# Patient Record
Sex: MALE | Race: WHITE | Employment: FULL TIME | URBAN - METROPOLITAN AREA
[De-identification: names, ages, dates, MRNs, and addresses within clinical notes are randomized per-mention and may not be internally consistent; named-entity substitution may affect disease eponyms.]

---

## 2017-05-22 ENCOUNTER — HOSPITAL ENCOUNTER (OUTPATIENT)
Age: 52
Discharge: HOME | End: 2017-05-22
Payer: COMMERCIAL

## 2017-05-22 ENCOUNTER — HOSPITAL ENCOUNTER (OUTPATIENT)
Dept: GENERAL RADIOLOGY | Age: 52
Discharge: HOME | End: 2017-05-22
Payer: COMMERCIAL

## 2017-05-22 DIAGNOSIS — M25.562 LEFT KNEE PAIN, UNSPECIFIED CHRONICITY: ICD-10-CM

## 2017-05-22 LAB
ALBUMIN SERPL-MCNC: 4.5 G/DL (ref 3.5–5.2)
ALBUMIN/GLOBULIN RATIO: NORMAL (ref 1–2.5)
ALP BLD-CCNC: 66 U/L (ref 40–129)
ALT SERPL-CCNC: 38 U/L (ref 5–41)
AST SERPL-CCNC: 18 U/L
BILIRUB SERPL-MCNC: 0.49 MG/DL (ref 0.3–1.2)
BILIRUBIN DIRECT: 0.09 MG/DL
BILIRUBIN, INDIRECT: 0.4 MG/DL (ref 0–1)
CALCIUM IONIZED: 1.25 MMOL/L (ref 1.13–1.33)
CALCIUM SERPL-MCNC: 9.5 MG/DL (ref 8.6–10.4)
CHOLESTEROL/HDL RATIO: 3.8
CHOLESTEROL: 164 MG/DL
FOLATE: 14.9 NG/ML
GLOBULIN: NORMAL G/DL (ref 1.5–3.8)
HDLC SERPL-MCNC: 43 MG/DL
HEMOGLOBIN: 15.9 G/DL (ref 13.5–17.5)
IRON: 69 UG/DL (ref 59–158)
LDL CHOLESTEROL: 86 MG/DL (ref 0–130)
PTH INTACT: 78.54 PG/ML (ref 15–65)
TOTAL PROTEIN: 7.3 G/DL (ref 6.4–8.3)
TRIGL SERPL-MCNC: 175 MG/DL
VITAMIN B-12: 529 PG/ML (ref 211–946)
VITAMIN D 25-HYDROXY: 10 NG/ML (ref 30–100)
VLDLC SERPL CALC-MCNC: ABNORMAL MG/DL (ref 1–30)

## 2017-05-22 PROCEDURE — 82310 ASSAY OF CALCIUM: CPT

## 2017-05-22 PROCEDURE — 80076 HEPATIC FUNCTION PANEL: CPT

## 2017-05-22 PROCEDURE — 85018 HEMOGLOBIN: CPT

## 2017-05-22 PROCEDURE — 36415 COLL VENOUS BLD VENIPUNCTURE: CPT

## 2017-05-22 PROCEDURE — 82330 ASSAY OF CALCIUM: CPT

## 2017-05-22 PROCEDURE — 73562 X-RAY EXAM OF KNEE 3: CPT

## 2017-05-22 PROCEDURE — 82746 ASSAY OF FOLIC ACID SERUM: CPT

## 2017-05-22 PROCEDURE — 83540 ASSAY OF IRON: CPT

## 2017-05-22 PROCEDURE — 83970 ASSAY OF PARATHORMONE: CPT

## 2017-05-22 PROCEDURE — 83036 HEMOGLOBIN GLYCOSYLATED A1C: CPT

## 2017-05-22 PROCEDURE — 82607 VITAMIN B-12: CPT

## 2017-05-22 PROCEDURE — 82306 VITAMIN D 25 HYDROXY: CPT

## 2017-05-22 PROCEDURE — 80061 LIPID PANEL: CPT

## 2017-05-23 LAB
ESTIMATED AVERAGE GLUCOSE: 103 MG/DL
HBA1C MFR BLD: 5.2 % (ref 4–6)

## 2018-03-27 ENCOUNTER — HOSPITAL ENCOUNTER (OUTPATIENT)
Age: 53
Discharge: HOME | End: 2018-03-27
Payer: COMMERCIAL

## 2018-03-27 LAB
ALBUMIN SERPL-MCNC: 4.2 G/DL (ref 3.5–5.2)
ALBUMIN/GLOBULIN RATIO: NORMAL (ref 1–2.5)
ALP BLD-CCNC: 73 U/L (ref 40–129)
ALT SERPL-CCNC: 24 U/L (ref 5–41)
AST SERPL-CCNC: 17 U/L
BILIRUB SERPL-MCNC: 0.62 MG/DL (ref 0.3–1.2)
BILIRUBIN DIRECT: 0.17 MG/DL
BILIRUBIN, INDIRECT: 0.45 MG/DL (ref 0–1)
FOLATE: >20 NG/ML
GLOBULIN: NORMAL G/DL (ref 1.5–3.8)
HCT VFR BLD CALC: 43.8 % (ref 41–53)
HEMOGLOBIN: 14.5 G/DL (ref 13.5–17.5)
IRON: 38 UG/DL (ref 59–158)
MCH RBC QN AUTO: 28.1 PG (ref 26–34)
MCHC RBC AUTO-ENTMCNC: 33.2 G/DL (ref 31–37)
MCV RBC AUTO: 84.7 FL (ref 80–100)
NRBC AUTOMATED: NORMAL PER 100 WBC
PDW BLD-RTO: 14.5 % (ref 11.5–14.9)
PLATELET # BLD: 173 K/UL (ref 150–450)
PMV BLD AUTO: 9.5 FL (ref 6–12)
RBC # BLD: 5.17 M/UL (ref 4.5–5.9)
TOTAL PROTEIN: 6.7 G/DL (ref 6.4–8.3)
VITAMIN B-12: 817 PG/ML (ref 232–1245)
VITAMIN D 25-HYDROXY: 55.3 NG/ML (ref 30–100)
WBC # BLD: 5.6 K/UL (ref 3.5–11)

## 2018-03-27 PROCEDURE — 36415 COLL VENOUS BLD VENIPUNCTURE: CPT

## 2018-03-27 PROCEDURE — 82746 ASSAY OF FOLIC ACID SERUM: CPT

## 2018-03-27 PROCEDURE — 85027 COMPLETE CBC AUTOMATED: CPT

## 2018-03-27 PROCEDURE — 82306 VITAMIN D 25 HYDROXY: CPT

## 2018-03-27 PROCEDURE — 83540 ASSAY OF IRON: CPT

## 2018-03-27 PROCEDURE — 84425 ASSAY OF VITAMIN B-1: CPT

## 2018-03-27 PROCEDURE — 80076 HEPATIC FUNCTION PANEL: CPT

## 2018-03-27 PROCEDURE — 82607 VITAMIN B-12: CPT

## 2018-03-31 LAB — VITAMIN B1 WHOLE BLOOD: 122 NMOL/L (ref 70–180)

## 2020-08-27 ENCOUNTER — OFFICE VISIT (OUTPATIENT)
Dept: ORTHOPEDIC SURGERY | Age: 55
End: 2020-08-27
Payer: COMMERCIAL

## 2020-08-27 PROCEDURE — 99213 OFFICE O/P EST LOW 20 MIN: CPT | Performed by: ORTHOPAEDIC SURGERY

## 2020-08-27 NOTE — PROGRESS NOTES
Ratna Conti M.D.            [address] [place] [address]., [address] [facility],[address], [address] [facility]           Dept Phone: [phone]           Dept Fax:  [phone]           Duyen Belcher          Dept Phone: [phone]           Dept Fax:  [phone]      Chief Compliant:  Chief Complaint   Patient presents with    Pain     Rt knee         History of Present Illness: This is a 47 y.o. male who presents to the clinic today for evaluation / follow up of severe bilateral knee pain. Patient has a history of previous ACL reconstruction of his left knee done approximately 18 years ago. Patient has had bariatric surgery and has 160 pound weight loss. Patient has had numerous injections to his knees in the past.  Patient works as a  and is difficult for him to do his work. He is having difficulty ambulating and is affecting his sleep. He is having a tough time doing his daily activities. Patient has tried anti-inflammatories years in the past.  He is difficult for him to do any exercises given his deformities. Review of Systems   Constitutional: Negative for fever, chills, sweats. Eyes: Negative for changes in vision, or pain. HENT: Negative for ear ache, epistaxis, or sore throat. Respiratory/Cardio: Negative for Chest pain, palpitations, SOB, or cough. Gastrointestinal: Negative for abdominal pain, N/V/D. Genitourinary: Negative for dysuria, frequency, urgency, or hematuria. Neurological: Negative for headache, numbness, or weakness. Integumentary: Negative for rash, itching, laceration, or abrasion. Musculoskeletal: Positive for Pain (Rt knee )       Physical Exam:  Constitutional: Patient is oriented to person, place, and time. Patient appears well-developed and well nourished.    HENT: Negative otherwise noted  Head: Normocephalic and Atraumatic  Nose: Normal  Eyes: Conjunctivae and EOM are normal  Neck: Normal range of motion Neck supple. Respiratory/Cardio: Effort normal. No respiratory distress. Musculoskeletal: Examination of the patient's left knee notes severe varus deformity he has a least a 12 degree flexion contracture. He flexes about 110 degrees significant crepitus noted throughout. Difficult to assess endpoint on Lockman's. Collaterals appear to be stable significant crepitus and grinding with patellofemoral alignment. Examination of the right knee notes about a 10 degree flexion contracture severe varus crepitus and grinding and pain throughout Terri's moderately positive collaterals are good patellofemoral joint is markedly painful. Neurological: Patient is alert and oriented to person, place, and time. Normal strenght. No sensory deficit. Skin: Skin is warm and dry  Psychiatric: Behavior is normal. Thought content normal.  Nursing note and vitals reviewed. Labs and Imaging:     XR taken today:  Xr Knee Right (1-2 Views)    Result Date: 8/27/2020  X-rays taken today reviewed by me show standing AP both knees and lateral of the right. Patient is status post ACL reconstruction left knee. He has severe varus gonarthrosis of both knees with severe varus deformities bone-on-bone apposition lateral tibial subluxation and spur formation. Patient has interference screw and post screw on the left side. No orders of the defined types were placed in this encounter. Assessment and Plan:  1. Acute pain of right knee    2. 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 male who presents to the clinic today for evaluation / follow up of severe DJD both knees. Past History:  No current outpatient medications on file.   No Known Allergies  Social History     Socioeconomic History    Marital status:      Spouse name: Not on file    Number of children: Not on file    Years of education: Not on file    Highest education level: Not on file   Occupational History    Not on file   Social Needs    Financial resource strain: Not on file    Food insecurity     Worry: Not on file     Inability: Not on file    Transportation needs     Medical: Not on file     Non-medical: Not on file   Tobacco Use    Smoking status: Not on file   Substance and Sexual Activity    Alcohol use: Not on file    Drug use: Not on file    Sexual activity: Not on file   Lifestyle    Physical activity     Days per week: Not on file     Minutes per session: Not on file    Stress: Not on file   Relationships    Social connections     Talks on phone: Not on file     Gets together: Not on file     Attends [employer] service: Not on file     Active member of club or organization: Not on file     Attends meetings of clubs or organizations: Not on file     Relationship status: Not on file    Intimate partner violence     Fear of current or ex partner: Not on file     Emotionally abused: Not on file     Physically abused: Not on file     Forced sexual activity: Not on file   Other Topics Concern    Not on file   Social History Narrative    Not on file     No past medical history on file. No past surgical history on file. No family history on file. Plan  Patient was counseled extensively regarding his knees. He is well beyond what any injections medications or physical therapy can do as his bones show severe deformity. Patient has a severe antalgic gait cannot perform his daily activities. I think the patient is an excellent candidate for bilateral total knee arthroplasty. He is experiencing significant weight loss secondary to bariatric surgery and is doing very well in this regard. He has no major medical comorbidities is not on any anticoagulants. I informed the patient he is looking at approximately 2 to 3 months off work with this.   He does wish to proceed with a bilateral total knee arthroplasty we will get him scheduled accordingly. Provider Attestation:  Ruthie Led, personally performed the services described in this documentation. All medical record entries made by the scribe were at my direction and in my presence. I have reviewed the chart and discharge instructions and agree that the records reflect my personal performance and is accurate and complete. Kaiser Irwin MD. 08/27/20      Please note that this chart was generated using voice recognition Dragon dictation software. Although every effort was made to ensure the accuracy of this automated transcription, some errors in transcription may have occurred.

## 2020-09-11 ENCOUNTER — HOSPITAL ENCOUNTER (OUTPATIENT)
Dept: PREADMISSION TESTING | Age: 55
Discharge: HOME | End: 2020-09-15
Payer: COMMERCIAL

## 2020-09-11 VITALS
HEIGHT: 71 IN | WEIGHT: 286 LBS | SYSTOLIC BLOOD PRESSURE: 123 MMHG | RESPIRATION RATE: 18 BRPM | BODY MASS INDEX: 40.04 KG/M2 | OXYGEN SATURATION: 98 % | DIASTOLIC BLOOD PRESSURE: 79 MMHG | TEMPERATURE: 97.9 F | HEART RATE: 66 BPM

## 2020-09-11 LAB
ABSOLUTE EOS #: 0.1 K/UL (ref 0–0.4)
ABSOLUTE IMMATURE GRANULOCYTE: ABNORMAL K/UL (ref 0–0.3)
ABSOLUTE LYMPH #: 1.4 K/UL (ref 1–4.8)
ABSOLUTE MONO #: 0.7 K/UL (ref 0.1–1.3)
ANION GAP SERPL CALCULATED.3IONS-SCNC: 12 MMOL/L (ref 9–17)
BASOPHILS # BLD: 1 % (ref 0–2)
BASOPHILS ABSOLUTE: 0 K/UL (ref 0–0.2)
BILIRUBIN URINE: NEGATIVE
BUN BLDV-MCNC: 13 MG/DL (ref 6–20)
BUN/CREAT BLD: ABNORMAL (ref 9–20)
CALCIUM SERPL-MCNC: 9.6 MG/DL (ref 8.6–10.4)
CHLORIDE BLD-SCNC: 101 MMOL/L (ref 98–107)
CO2: 25 MMOL/L (ref 20–31)
COLOR: YELLOW
COMMENT UA: NORMAL
CREAT SERPL-MCNC: 0.85 MG/DL (ref 0.7–1.2)
DIFFERENTIAL TYPE: ABNORMAL
EOSINOPHILS RELATIVE PERCENT: 2 % (ref 0–4)
GFR AFRICAN AMERICAN: >60 ML/MIN
GFR NON-AFRICAN AMERICAN: >60 ML/MIN
GFR SERPL CREATININE-BSD FRML MDRD: ABNORMAL ML/MIN/{1.73_M2}
GFR SERPL CREATININE-BSD FRML MDRD: ABNORMAL ML/MIN/{1.73_M2}
GLUCOSE BLD-MCNC: 108 MG/DL (ref 70–99)
GLUCOSE URINE: NEGATIVE
HCT VFR BLD CALC: 45.1 % (ref 41–53)
HEMOGLOBIN: 15.3 G/DL (ref 13.5–17.5)
IMMATURE GRANULOCYTES: ABNORMAL %
KETONES, URINE: NEGATIVE
LEUKOCYTE ESTERASE, URINE: NEGATIVE
LYMPHOCYTES # BLD: 25 % (ref 24–44)
MCH RBC QN AUTO: 29.5 PG (ref 26–34)
MCHC RBC AUTO-ENTMCNC: 34 G/DL (ref 31–37)
MCV RBC AUTO: 86.9 FL (ref 80–100)
MONOCYTES # BLD: 12 % (ref 1–7)
NITRITE, URINE: NEGATIVE
NRBC AUTOMATED: ABNORMAL PER 100 WBC
PDW BLD-RTO: 13.7 % (ref 11.5–14.9)
PH UA: 7.5 (ref 5–8)
PLATELET # BLD: 201 K/UL (ref 150–450)
PLATELET ESTIMATE: ABNORMAL
PMV BLD AUTO: 8.3 FL (ref 6–12)
POTASSIUM SERPL-SCNC: 4.4 MMOL/L (ref 3.7–5.3)
PROTEIN UA: NEGATIVE
RBC # BLD: 5.19 M/UL (ref 4.5–5.9)
RBC # BLD: ABNORMAL 10*6/UL
SEG NEUTROPHILS: 60 % (ref 36–66)
SEGMENTED NEUTROPHILS ABSOLUTE COUNT: 3.5 K/UL (ref 1.3–9.1)
SODIUM BLD-SCNC: 138 MMOL/L (ref 135–144)
SPECIFIC GRAVITY UA: 1.01 (ref 1–1.03)
TURBIDITY: CLEAR
URINE HGB: NEGATIVE
UROBILINOGEN, URINE: NORMAL
WBC # BLD: 5.8 K/UL (ref 3.5–11)
WBC # BLD: ABNORMAL 10*3/UL

## 2020-09-11 PROCEDURE — 93005 ELECTROCARDIOGRAM TRACING: CPT | Performed by: ANESTHESIOLOGY

## 2020-09-11 PROCEDURE — 85025 COMPLETE CBC W/AUTO DIFF WBC: CPT

## 2020-09-11 PROCEDURE — 81003 URINALYSIS AUTO W/O SCOPE: CPT

## 2020-09-11 PROCEDURE — 87641 MR-STAPH DNA AMP PROBE: CPT

## 2020-09-11 PROCEDURE — 80048 BASIC METABOLIC PNL TOTAL CA: CPT

## 2020-09-11 PROCEDURE — 36415 COLL VENOUS BLD VENIPUNCTURE: CPT

## 2020-09-11 RX ORDER — LANOLIN ALCOHOL/MO/W.PET/CERES
2 CREAM (GRAM) TOPICAL 3 TIMES DAILY
COMMUNITY

## 2020-09-11 RX ORDER — ACETAMINOPHEN 500 MG
500 TABLET ORAL EVERY 6 HOURS PRN
COMMUNITY

## 2020-09-11 NOTE — PROGRESS NOTES
Dr. Digna Blackburn, anesthesia, was contacted and informed of patient's history and planned surgery. Medical clearance requested. Surgery scheduling will notify Dr. Carol Vazquez office who will be responsible for making sure the clearance is obtained and is in the chart for surgery. FYI: Patient is afraid of needles, he became diaphoretic and pale while drawing labs today.

## 2020-09-11 NOTE — H&P (VIEW-ONLY)
HISTORY and [facility] [address]       NAME:  Dolores Huddleston  MRN: [id number]   YOB: 1965   Date: 9/11/2020   Age: 47 y.o. Gender: male       COMPLAINT AND PRESENT HISTORY:     Dolores Huddleston is 47 y.o.,  male, undergoing preadmission testing for KNEE TOTAL ARTHROPLASTY BILATERAL pt has history of DJD BILATERAL KNEES. Pt states, Right knee pain  started couple months ago, the left knee pain istarted since he was in the high shool. Patient C/O of pain  stiffness, popping and cracking in the bilateral Knee. Pt describes the pain as sharp/aching. 8/10 in the right knee and 4/10 in the left knee. The pain increase with walking or standing for long time , the pain decrease with sitting. Pt has had couple injection in the left knee before but did not work, no injection in the right knee. Pt states he had ACL done on the left knee 2005. The both knee does not buckle, give way under the patient. No locking up, No recent falls or trauma. No redness, swelling or rashes. No Hx of MRSA infections in the past.  Pt denies any problems with anesthesia before. Tenderness on palpation of the Rt Knee joint space worse on the medial  aspect.     Rt and Lt Knee Arthroscopy  XR KNEE RIGHT (1-2 VIEWS)      Narrative    X-rays taken today reviewed by me show standing AP both knees and lateral    of the right.  Patient is status post ACL reconstruction left knee.  He    has severe varus gonarthrosis of both knees with severe varus deformities    bone-on-bone apposition lateral tibial subluxation and spur formation.      Patient has interference screw and post screw on the left side.         XR KNEE LEFT STANDARD     Impression    No acute fracture or dislocation.  Moderate to severe degenerative changes of    the left knee joint.                  PAST MEDICAL HISTORY     Past Medical History:   Diagnosis Date    Arthritis     Sleep apnea     does not use machine-has improved since gastric bypass         SURGICAL HISTORY       Past Surgical History:   Procedure Laterality Date    GASTRIC BYPASS SURGERY  2017    KNEE ARTHROSCOPY Left 2005    ACL repair       FAMILY HISTORY     History reviewed. No pertinent family history.     SOCIAL HISTORY       Social History     Socioeconomic History    Marital status:      Spouse name: None    Number of children: None    Years of education: None    Highest education level: None   Occupational History    None   Social Needs    Financial resource strain: None    Food insecurity     Worry: None     Inability: None    Transportation needs     Medical: None     Non-medical: None   Tobacco Use    Smoking status: Former Smoker     Last attempt to quit: 2005     Years since quitting: 15.7    Smokeless tobacco: Never Used   Substance and Sexual Activity    Alcohol use: Not Currently    Drug use: Not Currently    Sexual activity: None   Lifestyle    Physical activity     Days per week: None     Minutes per session: None    Stress: None   Relationships    Social connections     Talks on phone: None     Gets together: None     Attends Worship service: None     Active member of club or organization: None     Attends meetings of clubs or organizations: None     Relationship status: None    Intimate partner violence     Fear of current or ex partner: None     Emotionally abused: None     Physically abused: None     Forced sexual activity: None   Other Topics Concern    None   Social History Narrative    None           REVIEW OF SYSTEMS      No Known Allergies    Current Outpatient Medications on File Prior to Encounter   Medication Sig Dispense Refill    calcium citrate-vitamin D (CALCIUM CITRATE + D) 315-200 MG-UNIT per tablet Take 2 tablets by mouth 3 times daily Takes 2 early Am   2 morning   1 evening      Niacin (VITAMIN B-3 PO) Place 1,000 mg under the tongue daily      Prenatal Multivit-Min-Fe-FA (PRENATAL 1 + IRON PO) Take 1 tablet by mouth daily      acetaminophen (TYLENOL) 500 MG tablet Take 500 mg by mouth every 6 hours as needed for Pain       No current facility-administered medications on file prior to encounter. General health:  Fairly good. No fever or chills. Skin:  No itching, redness or rash. HEENT:  No headache, epistaxis or sore throat. Neck:  No pain, stiffness or masses. Cardiovascular/Respiratory system:  No chest pain, palpitation or shortness of breath. Gastrointestinal tract: No abdominal pain, Dysphagia, nausea, vomiting, diarrhea or constipation. Genitourinary:  No burning on micturition. No hesitancy, urgency, frequency or discoloration of urine. Locomotor:  Positive for bilateral knees pain . No swelling. Neuropsychiatric:  No referable complaints. See HPI. GENERAL PHYSICAL EXAM:     Vitals: /79   Pulse 66   Temp 97.9 °F (36.6 °C) (Temporal)   Resp 18   Ht 5' 11\" (1.803 m)   Wt 286 lb (129.7 kg)   SpO2 98%   BMI 39.89 kg/m²  Body mass index is 39.89 kg/m². GENERAL APPEARANCE:   Mary Mendoza is 47 y.o.,  male, moderately obese, nourished, conscious, alert. Does not appear to be distress or pain at this time. SKIN:  Warm, dry, no cyanosis or jaundice. HEAD:  Normocephalic, atraumatic, no swelling or tenderness. EYES:  Pupils equal, reactive to light. EARS:  No discharge, no marked hearing loss. NOSE:  No rhinorrhea, epistaxis or septal deformity. THROAT:  Not congested. No ulceration bleeding or discharge. NECK:  No stiffness, trachea central.                  CHEST:  Symmetrical and equal on expansion. HEART:  RRR S1 > S2. No audible murmurs or gallops.                  LUNGS:  Equal on expansion, normal breath sounds. No adventitious sounds. ABDOMEN:  Obese. Soft on palpation. No localized tenderness. No guarding or rigidity. No palpable hepatosplenomegaly. LYMPHATICS:  No palpable cervical lymphadenopathy. LOCOMOTOR, BACK AND SPINE:  No tenderness or deformities. EXTREMITIES:  Symmetrical, no pretibial edema. Wilbers sign negative or no calf tenderness. No discoloration or ulcerations. Tenderness on palpation of the Rt Knee joint space worse on the medial  aspect. NEUROLOGIC:  The patient is conscious, alert, oriented,Cranial nerve II-XII intact, taste and smell were not examined. No apparent focal sensory or motor deficits. PROVISIONAL DIAGNOSES / SURGERY:    DJD BILATERAL KNEES  KNEE TOTAL ARTHROPLASTY BILATERAL     There are no active problems to display for this patient.           Iverson Gosselin, APRN - CNP on 9/11/2020 at 12:28 PM

## 2020-09-11 NOTE — H&P
since gastric bypass         SURGICAL HISTORY       Past Surgical History:   Procedure Laterality Date    GASTRIC BYPASS SURGERY  2017    KNEE ARTHROSCOPY Left 2005    ACL repair       FAMILY HISTORY     History reviewed. No pertinent family history.     SOCIAL HISTORY       Social History     Socioeconomic History    Marital status:      Spouse name: None    Number of children: None    Years of education: None    Highest education level: None   Occupational History    None   Social Needs    Financial resource strain: None    Food insecurity     Worry: None     Inability: None    Transportation needs     Medical: None     Non-medical: None   Tobacco Use    Smoking status: Former Smoker     Last attempt to quit: 2005     Years since quitting: 15.7    Smokeless tobacco: Never Used   Substance and Sexual Activity    Alcohol use: Not Currently    Drug use: Not Currently    Sexual activity: None   Lifestyle    Physical activity     Days per week: None     Minutes per session: None    Stress: None   Relationships    Social connections     Talks on phone: None     Gets together: None     Attends Anglican service: None     Active member of club or organization: None     Attends meetings of clubs or organizations: None     Relationship status: None    Intimate partner violence     Fear of current or ex partner: None     Emotionally abused: None     Physically abused: None     Forced sexual activity: None   Other Topics Concern    None   Social History Narrative    None           REVIEW OF SYSTEMS      No Known Allergies    Current Outpatient Medications on File Prior to Encounter   Medication Sig Dispense Refill    calcium citrate-vitamin D (CALCIUM CITRATE + D) 315-200 MG-UNIT per tablet Take 2 tablets by mouth 3 times daily Takes 2 early Am   2 morning   1 evening      Niacin (VITAMIN B-3 PO) Place 1,000 mg under the tongue daily      Prenatal Multivit-Min-Fe-FA (PRENATAL 1 + IRON PO) Take 1 tablet by mouth daily      acetaminophen (TYLENOL) 500 MG tablet Take 500 mg by mouth every 6 hours as needed for Pain       No current facility-administered medications on file prior to encounter. General health:  Fairly good. No fever or chills. Skin:  No itching, redness or rash. HEENT:  No headache, epistaxis or sore throat. Neck:  No pain, stiffness or masses. Cardiovascular/Respiratory system:  No chest pain, palpitation or shortness of breath. Gastrointestinal tract: No abdominal pain, Dysphagia, nausea, vomiting, diarrhea or constipation. Genitourinary:  No burning on micturition. No hesitancy, urgency, frequency or discoloration of urine. Locomotor:  Positive for bilateral knees pain . No swelling. Neuropsychiatric:  No referable complaints. See HPI. GENERAL PHYSICAL EXAM:     Vitals: /79   Pulse 66   Temp 97.9 °F (36.6 °C) (Temporal)   Resp 18   Ht 5' 11\" (1.803 m)   Wt 286 lb (129.7 kg)   SpO2 98%   BMI 39.89 kg/m²  Body mass index is 39.89 kg/m². GENERAL APPEARANCE:   Lubna Bhakta is 47 y.o.,  male, moderately obese, nourished, conscious, alert. Does not appear to be distress or pain at this time. SKIN:  Warm, dry, no cyanosis or jaundice. HEAD:  Normocephalic, atraumatic, no swelling or tenderness. EYES:  Pupils equal, reactive to light. EARS:  No discharge, no marked hearing loss. NOSE:  No rhinorrhea, epistaxis or septal deformity. THROAT:  Not congested. No ulceration bleeding or discharge. NECK:  No stiffness, trachea central.                  CHEST:  Symmetrical and equal on expansion. HEART:  RRR S1 > S2. No audible murmurs or gallops.                  LUNGS:  Equal on expansion, normal breath sounds. No adventitious sounds. ABDOMEN:  Obese. Soft on palpation. No localized tenderness. No guarding or rigidity. No palpable hepatosplenomegaly. LYMPHATICS:  No palpable cervical lymphadenopathy. LOCOMOTOR, BACK AND SPINE:  No tenderness or deformities. EXTREMITIES:  Symmetrical, no pretibial edema. Wilbers sign negative or no calf tenderness. No discoloration or ulcerations. Tenderness on palpation of the Rt Knee joint space worse on the medial  aspect. NEUROLOGIC:  The patient is conscious, alert, oriented,Cranial nerve II-XII intact, taste and smell were not examined. No apparent focal sensory or motor deficits. PROVISIONAL DIAGNOSES / SURGERY:    DJD BILATERAL KNEES  KNEE TOTAL ARTHROPLASTY BILATERAL     There are no active problems to display for this patient.           ALLYSON Verde - CNP on 9/11/2020 at 12:28 PM

## 2020-09-13 LAB
DIRECT EXAM: NORMAL
Lab: NORMAL
SPECIMEN DESCRIPTION: NORMAL

## 2020-09-14 LAB
EKG ATRIAL RATE: 69 BPM
EKG P AXIS: 60 DEGREES
EKG P-R INTERVAL: 180 MS
EKG Q-T INTERVAL: 402 MS
EKG QRS DURATION: 92 MS
EKG QTC CALCULATION (BAZETT): 430 MS
EKG R AXIS: 57 DEGREES
EKG T AXIS: 44 DEGREES
EKG VENTRICULAR RATE: 69 BPM

## 2020-09-14 PROCEDURE — 93010 ELECTROCARDIOGRAM REPORT: CPT | Performed by: INTERNAL MEDICINE

## 2020-09-18 ENCOUNTER — HOSPITAL ENCOUNTER (OUTPATIENT)
Dept: PREADMISSION TESTING | Age: 55
Setting detail: SPECIMEN
Discharge: HOME | End: 2020-09-22
Payer: COMMERCIAL

## 2020-09-18 PROCEDURE — U0003 INFECTIOUS AGENT DETECTION BY NUCLEIC ACID (DNA OR RNA); SEVERE ACUTE RESPIRATORY SYNDROME CORONAVIRUS 2 (SARS-COV-2) (CORONAVIRUS DISEASE [COVID-19]), AMPLIFIED PROBE TECHNIQUE, MAKING USE OF HIGH THROUGHPUT TECHNOLOGIES AS DESCRIBED BY CMS-2020-01-R: HCPCS

## 2020-09-20 LAB — SARS-COV-2, NAA: NOT DETECTED

## 2020-09-21 ENCOUNTER — TELEPHONE (OUTPATIENT)
Dept: PRIMARY CARE CLINIC | Age: 55
End: 2020-09-21

## 2020-09-22 ENCOUNTER — ANESTHESIA (OUTPATIENT)
Dept: OPERATING ROOM | Age: 55
End: 2020-09-22
Payer: COMMERCIAL

## 2020-09-22 ENCOUNTER — ANESTHESIA EVENT (OUTPATIENT)
Dept: OPERATING ROOM | Age: 55
End: 2020-09-22
Payer: COMMERCIAL

## 2020-09-22 ENCOUNTER — APPOINTMENT (OUTPATIENT)
Dept: GENERAL RADIOLOGY | Age: 55
End: 2020-09-22
Attending: ORTHOPAEDIC SURGERY
Payer: COMMERCIAL

## 2020-09-22 ENCOUNTER — HOSPITAL ENCOUNTER (OUTPATIENT)
Age: 55
Setting detail: OBSERVATION
Discharge: HOME | End: 2020-09-25
Attending: ORTHOPAEDIC SURGERY | Admitting: ORTHOPAEDIC SURGERY
Payer: COMMERCIAL

## 2020-09-22 VITALS — DIASTOLIC BLOOD PRESSURE: 76 MMHG | TEMPERATURE: 97.3 F | SYSTOLIC BLOOD PRESSURE: 121 MMHG | OXYGEN SATURATION: 99 %

## 2020-09-22 PROBLEM — M17.0 PRIMARY OSTEOARTHRITIS OF KNEES, BILATERAL: Status: ACTIVE | Noted: 2020-09-22

## 2020-09-22 PROCEDURE — 3600000013 HC SURGERY LEVEL 3 ADDTL 15MIN: Performed by: ORTHOPAEDIC SURGERY

## 2020-09-22 PROCEDURE — 2709999900 HC NON-CHARGEABLE SUPPLY: Performed by: ORTHOPAEDIC SURGERY

## 2020-09-22 PROCEDURE — C1776 JOINT DEVICE (IMPLANTABLE): HCPCS | Performed by: ORTHOPAEDIC SURGERY

## 2020-09-22 PROCEDURE — 2720000010 HC SURG SUPPLY STERILE: Performed by: ORTHOPAEDIC SURGERY

## 2020-09-22 PROCEDURE — C1713 ANCHOR/SCREW BN/BN,TIS/BN: HCPCS | Performed by: ORTHOPAEDIC SURGERY

## 2020-09-22 PROCEDURE — 6360000002 HC RX W HCPCS

## 2020-09-22 PROCEDURE — 6360000002 HC RX W HCPCS: Performed by: ORTHOPAEDIC SURGERY

## 2020-09-22 PROCEDURE — 88300 SURGICAL PATH GROSS: CPT

## 2020-09-22 PROCEDURE — 7100000001 HC PACU RECOVERY - ADDTL 15 MIN: Performed by: ORTHOPAEDIC SURGERY

## 2020-09-22 PROCEDURE — 2580000003 HC RX 258: Performed by: ANESTHESIOLOGY

## 2020-09-22 PROCEDURE — 2500000003 HC RX 250 WO HCPCS

## 2020-09-22 PROCEDURE — 6370000000 HC RX 637 (ALT 250 FOR IP): Performed by: ORTHOPAEDIC SURGERY

## 2020-09-22 PROCEDURE — 96365 THER/PROPH/DIAG IV INF INIT: CPT

## 2020-09-22 PROCEDURE — 3600000003 HC SURGERY LEVEL 3 BASE: Performed by: ORTHOPAEDIC SURGERY

## 2020-09-22 PROCEDURE — 73560 X-RAY EXAM OF KNEE 1 OR 2: CPT

## 2020-09-22 PROCEDURE — 27447 TOTAL KNEE ARTHROPLASTY: CPT | Performed by: ORTHOPAEDIC SURGERY

## 2020-09-22 PROCEDURE — G0378 HOSPITAL OBSERVATION PER HR: HCPCS

## 2020-09-22 PROCEDURE — 2580000003 HC RX 258: Performed by: ORTHOPAEDIC SURGERY

## 2020-09-22 PROCEDURE — 64447 NJX AA&/STRD FEMORAL NRV IMG: CPT | Performed by: ANESTHESIOLOGY

## 2020-09-22 PROCEDURE — 2500000003 HC RX 250 WO HCPCS: Performed by: ANESTHESIOLOGY

## 2020-09-22 PROCEDURE — 3700000001 HC ADD 15 MINUTES (ANESTHESIA): Performed by: ORTHOPAEDIC SURGERY

## 2020-09-22 PROCEDURE — 7100000000 HC PACU RECOVERY - FIRST 15 MIN: Performed by: ORTHOPAEDIC SURGERY

## 2020-09-22 PROCEDURE — 3700000000 HC ANESTHESIA ATTENDED CARE: Performed by: ORTHOPAEDIC SURGERY

## 2020-09-22 DEVICE — IMPLANTABLE DEVICE: Type: IMPLANTABLE DEVICE | Site: KNEE | Status: FUNCTIONAL

## 2020-09-22 DEVICE — UPCHARGE KNEE VITAMIN E LINER ZIMMER BIOMET: Type: IMPLANTABLE DEVICE | Site: KNEE | Status: FUNCTIONAL

## 2020-09-22 DEVICE — CEMENT BNE 40GM HI VISC RADPQ FOR REV SURG: Type: IMPLANTABLE DEVICE | Site: KNEE | Status: FUNCTIONAL

## 2020-09-22 DEVICE — TRAY TIB L83MM KNEE CO CHROM FIN MOD INTLOK CEM VANGUARD: Type: IMPLANTABLE DEVICE | Site: KNEE | Status: FUNCTIONAL

## 2020-09-22 DEVICE — COMPONENT PAT DIA40MM THK10MM STD KNEE TI ALLY S STL UHMWPE: Type: IMPLANTABLE DEVICE | Site: KNEE | Status: FUNCTIONAL

## 2020-09-22 RX ORDER — 0.9 % SODIUM CHLORIDE 0.9 %
500 INTRAVENOUS SOLUTION INTRAVENOUS
Status: DISCONTINUED | OUTPATIENT
Start: 2020-09-22 | End: 2020-09-22 | Stop reason: HOSPADM

## 2020-09-22 RX ORDER — GABAPENTIN 400 MG/1
800 CAPSULE ORAL ONCE
Status: COMPLETED | OUTPATIENT
Start: 2020-09-22 | End: 2020-09-22

## 2020-09-22 RX ORDER — SUCCINYLCHOLINE/SOD CL,ISO/PF 200MG/10ML
SYRINGE (ML) INTRAVENOUS PRN
Status: DISCONTINUED | OUTPATIENT
Start: 2020-09-22 | End: 2020-09-22 | Stop reason: SDUPTHER

## 2020-09-22 RX ORDER — DIPHENHYDRAMINE HYDROCHLORIDE 50 MG/ML
12.5 INJECTION INTRAMUSCULAR; INTRAVENOUS
Status: DISCONTINUED | OUTPATIENT
Start: 2020-09-22 | End: 2020-09-22 | Stop reason: HOSPADM

## 2020-09-22 RX ORDER — SCOLOPAMINE TRANSDERMAL SYSTEM 1 MG/1
1 PATCH, EXTENDED RELEASE TRANSDERMAL ONCE
Status: DISCONTINUED | OUTPATIENT
Start: 2020-09-22 | End: 2020-09-25

## 2020-09-22 RX ORDER — LABETALOL HYDROCHLORIDE 5 MG/ML
INJECTION, SOLUTION INTRAVENOUS PRN
Status: DISCONTINUED | OUTPATIENT
Start: 2020-09-22 | End: 2020-09-22 | Stop reason: SDUPTHER

## 2020-09-22 RX ORDER — LIDOCAINE HYDROCHLORIDE 20 MG/ML
INJECTION, SOLUTION INFILTRATION; PERINEURAL
Status: DISCONTINUED | OUTPATIENT
Start: 2020-09-22 | End: 2020-09-22 | Stop reason: SDUPTHER

## 2020-09-22 RX ORDER — SODIUM CHLORIDE, SODIUM LACTATE, POTASSIUM CHLORIDE, CALCIUM CHLORIDE 600; 310; 30; 20 MG/100ML; MG/100ML; MG/100ML; MG/100ML
INJECTION, SOLUTION INTRAVENOUS CONTINUOUS
Status: DISCONTINUED | OUTPATIENT
Start: 2020-09-22 | End: 2020-09-25 | Stop reason: HOSPADM

## 2020-09-22 RX ORDER — LIDOCAINE HYDROCHLORIDE 10 MG/ML
INJECTION, SOLUTION EPIDURAL; INFILTRATION; INTRACAUDAL; PERINEURAL PRN
Status: DISCONTINUED | OUTPATIENT
Start: 2020-09-22 | End: 2020-09-22 | Stop reason: SDUPTHER

## 2020-09-22 RX ORDER — ACETAMINOPHEN 500 MG
1000 TABLET ORAL ONCE
Status: COMPLETED | OUTPATIENT
Start: 2020-09-22 | End: 2020-09-22

## 2020-09-22 RX ORDER — SODIUM CHLORIDE 0.9 % (FLUSH) 0.9 %
10 SYRINGE (ML) INJECTION EVERY 12 HOURS SCHEDULED
Status: DISCONTINUED | OUTPATIENT
Start: 2020-09-22 | End: 2020-09-25 | Stop reason: HOSPADM

## 2020-09-22 RX ORDER — SODIUM CHLORIDE, SODIUM LACTATE, POTASSIUM CHLORIDE, CALCIUM CHLORIDE 600; 310; 30; 20 MG/100ML; MG/100ML; MG/100ML; MG/100ML
INJECTION, SOLUTION INTRAVENOUS CONTINUOUS
Status: DISCONTINUED | OUTPATIENT
Start: 2020-09-22 | End: 2020-09-22

## 2020-09-22 RX ORDER — SODIUM CHLORIDE 0.9 % (FLUSH) 0.9 %
10 SYRINGE (ML) INJECTION PRN
Status: DISCONTINUED | OUTPATIENT
Start: 2020-09-22 | End: 2020-09-25 | Stop reason: HOSPADM

## 2020-09-22 RX ORDER — TRANEXAMIC ACID 100 MG/ML
INJECTION, SOLUTION INTRAVENOUS PRN
Status: DISCONTINUED | OUTPATIENT
Start: 2020-09-22 | End: 2020-09-22 | Stop reason: SDUPTHER

## 2020-09-22 RX ORDER — ONDANSETRON 2 MG/ML
INJECTION INTRAMUSCULAR; INTRAVENOUS PRN
Status: DISCONTINUED | OUTPATIENT
Start: 2020-09-22 | End: 2020-09-22 | Stop reason: SDUPTHER

## 2020-09-22 RX ORDER — PROMETHAZINE HYDROCHLORIDE 25 MG/ML
6.25 INJECTION, SOLUTION INTRAMUSCULAR; INTRAVENOUS
Status: DISCONTINUED | OUTPATIENT
Start: 2020-09-22 | End: 2020-09-22 | Stop reason: HOSPADM

## 2020-09-22 RX ORDER — PROPOFOL 10 MG/ML
INJECTION, EMULSION INTRAVENOUS PRN
Status: DISCONTINUED | OUTPATIENT
Start: 2020-09-22 | End: 2020-09-22 | Stop reason: SDUPTHER

## 2020-09-22 RX ORDER — OXYCODONE HYDROCHLORIDE 10 MG/1
10 TABLET ORAL EVERY 4 HOURS PRN
Status: DISCONTINUED | OUTPATIENT
Start: 2020-09-22 | End: 2020-09-25 | Stop reason: HOSPADM

## 2020-09-22 RX ORDER — OXYCODONE HYDROCHLORIDE 5 MG/1
5 TABLET ORAL EVERY 4 HOURS PRN
Status: DISCONTINUED | OUTPATIENT
Start: 2020-09-22 | End: 2020-09-25 | Stop reason: HOSPADM

## 2020-09-22 RX ORDER — HYDRALAZINE HYDROCHLORIDE 20 MG/ML
5 INJECTION INTRAMUSCULAR; INTRAVENOUS EVERY 10 MIN PRN
Status: DISCONTINUED | OUTPATIENT
Start: 2020-09-22 | End: 2020-09-22 | Stop reason: HOSPADM

## 2020-09-22 RX ORDER — ROCURONIUM BROMIDE 10 MG/ML
INJECTION, SOLUTION INTRAVENOUS PRN
Status: DISCONTINUED | OUTPATIENT
Start: 2020-09-22 | End: 2020-09-22 | Stop reason: SDUPTHER

## 2020-09-22 RX ORDER — FENTANYL CITRATE 50 UG/ML
25 INJECTION, SOLUTION INTRAMUSCULAR; INTRAVENOUS EVERY 5 MIN PRN
Status: DISCONTINUED | OUTPATIENT
Start: 2020-09-22 | End: 2020-09-22 | Stop reason: HOSPADM

## 2020-09-22 RX ORDER — BUPIVACAINE HYDROCHLORIDE 5 MG/ML
INJECTION, SOLUTION EPIDURAL; INTRACAUDAL
Status: DISCONTINUED | OUTPATIENT
Start: 2020-09-22 | End: 2020-09-22 | Stop reason: SDUPTHER

## 2020-09-22 RX ORDER — METOCLOPRAMIDE HYDROCHLORIDE 5 MG/ML
10 INJECTION INTRAMUSCULAR; INTRAVENOUS
Status: DISCONTINUED | OUTPATIENT
Start: 2020-09-22 | End: 2020-09-22 | Stop reason: HOSPADM

## 2020-09-22 RX ORDER — ACETAMINOPHEN 325 MG/1
650 TABLET ORAL EVERY 6 HOURS
Status: DISCONTINUED | OUTPATIENT
Start: 2020-09-22 | End: 2020-09-25 | Stop reason: HOSPADM

## 2020-09-22 RX ORDER — DEXAMETHASONE SODIUM PHOSPHATE 10 MG/ML
10 INJECTION INTRAMUSCULAR; INTRAVENOUS ONCE
Status: COMPLETED | OUTPATIENT
Start: 2020-09-22 | End: 2020-09-22

## 2020-09-22 RX ORDER — FENTANYL CITRATE 50 UG/ML
INJECTION, SOLUTION INTRAMUSCULAR; INTRAVENOUS PRN
Status: DISCONTINUED | OUTPATIENT
Start: 2020-09-22 | End: 2020-09-22 | Stop reason: SDUPTHER

## 2020-09-22 RX ORDER — OXYCODONE HYDROCHLORIDE AND ACETAMINOPHEN 5; 325 MG/1; MG/1
1 TABLET ORAL
Status: DISCONTINUED | OUTPATIENT
Start: 2020-09-22 | End: 2020-09-22 | Stop reason: HOSPADM

## 2020-09-22 RX ORDER — LABETALOL HYDROCHLORIDE 5 MG/ML
5 INJECTION, SOLUTION INTRAVENOUS EVERY 10 MIN PRN
Status: DISCONTINUED | OUTPATIENT
Start: 2020-09-22 | End: 2020-09-22 | Stop reason: HOSPADM

## 2020-09-22 RX ORDER — MIDAZOLAM HYDROCHLORIDE 1 MG/ML
INJECTION INTRAMUSCULAR; INTRAVENOUS PRN
Status: DISCONTINUED | OUTPATIENT
Start: 2020-09-22 | End: 2020-09-22 | Stop reason: SDUPTHER

## 2020-09-22 RX ORDER — SENNA AND DOCUSATE SODIUM 50; 8.6 MG/1; MG/1
1 TABLET, FILM COATED ORAL 2 TIMES DAILY
Status: DISCONTINUED | OUTPATIENT
Start: 2020-09-22 | End: 2020-09-25 | Stop reason: HOSPADM

## 2020-09-22 RX ADMIN — ROCURONIUM BROMIDE 5 MG: 10 INJECTION INTRAVENOUS at 13:46

## 2020-09-22 RX ADMIN — FENTANYL CITRATE 50 MCG: 50 INJECTION, SOLUTION INTRAMUSCULAR; INTRAVENOUS at 15:27

## 2020-09-22 RX ADMIN — SODIUM CHLORIDE, POTASSIUM CHLORIDE, SODIUM LACTATE AND CALCIUM CHLORIDE: 600; 310; 30; 20 INJECTION, SOLUTION INTRAVENOUS at 15:01

## 2020-09-22 RX ADMIN — SODIUM CHLORIDE, POTASSIUM CHLORIDE, SODIUM LACTATE AND CALCIUM CHLORIDE: 600; 310; 30; 20 INJECTION, SOLUTION INTRAVENOUS at 12:19

## 2020-09-22 RX ADMIN — FENTANYL CITRATE 25 MCG: 50 INJECTION, SOLUTION INTRAMUSCULAR; INTRAVENOUS at 16:27

## 2020-09-22 RX ADMIN — ROCURONIUM BROMIDE 45 MG: 10 INJECTION INTRAVENOUS at 13:55

## 2020-09-22 RX ADMIN — PROPOFOL 200 MG: 10 INJECTION, EMULSION INTRAVENOUS at 13:46

## 2020-09-22 RX ADMIN — LABETALOL HYDROCHLORIDE 5 MG: 5 INJECTION, SOLUTION INTRAVENOUS at 14:37

## 2020-09-22 RX ADMIN — DEXAMETHASONE SODIUM PHOSPHATE 10 MG: 10 INJECTION INTRAMUSCULAR; INTRAVENOUS at 12:18

## 2020-09-22 RX ADMIN — FENTANYL CITRATE 50 MCG: 50 INJECTION, SOLUTION INTRAMUSCULAR; INTRAVENOUS at 14:21

## 2020-09-22 RX ADMIN — CEFAZOLIN 3 G: 1 INJECTION, POWDER, FOR SOLUTION INTRAMUSCULAR; INTRAVENOUS at 22:10

## 2020-09-22 RX ADMIN — MIDAZOLAM 2 MG: 1 INJECTION INTRAMUSCULAR; INTRAVENOUS at 13:40

## 2020-09-22 RX ADMIN — TRANEXAMIC ACID 1000 MG: 100 INJECTION, SOLUTION INTRAVENOUS at 16:03

## 2020-09-22 RX ADMIN — FENTANYL CITRATE 50 MCG: 50 INJECTION, SOLUTION INTRAMUSCULAR; INTRAVENOUS at 14:58

## 2020-09-22 RX ADMIN — LIDOCAINE HYDROCHLORIDE 20 ML: 20 INJECTION, SOLUTION INFILTRATION; PERINEURAL at 17:10

## 2020-09-22 RX ADMIN — LABETALOL HYDROCHLORIDE 5 MG: 5 INJECTION, SOLUTION INTRAVENOUS at 14:40

## 2020-09-22 RX ADMIN — LABETALOL HYDROCHLORIDE 10 MG: 5 INJECTION, SOLUTION INTRAVENOUS at 15:18

## 2020-09-22 RX ADMIN — FENTANYL CITRATE 50 MCG: 50 INJECTION, SOLUTION INTRAMUSCULAR; INTRAVENOUS at 13:46

## 2020-09-22 RX ADMIN — FENTANYL CITRATE 25 MCG: 50 INJECTION, SOLUTION INTRAMUSCULAR; INTRAVENOUS at 16:25

## 2020-09-22 RX ADMIN — FENTANYL CITRATE 50 MCG: 50 INJECTION, SOLUTION INTRAMUSCULAR; INTRAVENOUS at 14:07

## 2020-09-22 RX ADMIN — ACETAMINOPHEN 650 MG: 325 TABLET, FILM COATED ORAL at 22:10

## 2020-09-22 RX ADMIN — LIDOCAINE HYDROCHLORIDE 50 MG: 10 INJECTION, SOLUTION EPIDURAL; INFILTRATION; INTRACAUDAL; PERINEURAL at 13:46

## 2020-09-22 RX ADMIN — TRANEXAMIC ACID 1000 MG: 100 INJECTION, SOLUTION INTRAVENOUS at 13:57

## 2020-09-22 RX ADMIN — Medication 3 G: at 13:57

## 2020-09-22 RX ADMIN — BUPIVACAINE HYDROCHLORIDE 25 ML: 5 INJECTION, SOLUTION EPIDURAL; INTRACAUDAL at 17:10

## 2020-09-22 RX ADMIN — ONDANSETRON 4 MG: 2 INJECTION INTRAMUSCULAR; INTRAVENOUS at 16:20

## 2020-09-22 RX ADMIN — FENTANYL CITRATE 50 MCG: 50 INJECTION, SOLUTION INTRAMUSCULAR; INTRAVENOUS at 14:13

## 2020-09-22 RX ADMIN — ASPIRIN 325 MG: 325 TABLET, COATED ORAL at 22:10

## 2020-09-22 RX ADMIN — SUGAMMADEX 259 MG: 100 INJECTION, SOLUTION INTRAVENOUS at 16:23

## 2020-09-22 RX ADMIN — GABAPENTIN 800 MG: 400 CAPSULE ORAL at 12:08

## 2020-09-22 RX ADMIN — Medication 140 MG: at 13:46

## 2020-09-22 RX ADMIN — ACETAMINOPHEN 1000 MG: 500 TABLET, FILM COATED ORAL at 12:08

## 2020-09-22 RX ADMIN — FENTANYL CITRATE 50 MCG: 50 INJECTION, SOLUTION INTRAMUSCULAR; INTRAVENOUS at 13:52

## 2020-09-22 RX ADMIN — SODIUM CHLORIDE, POTASSIUM CHLORIDE, SODIUM LACTATE AND CALCIUM CHLORIDE: 600; 310; 30; 20 INJECTION, SOLUTION INTRAVENOUS at 19:52

## 2020-09-22 ASSESSMENT — PULMONARY FUNCTION TESTS
PIF_VALUE: 21
PIF_VALUE: 0
PIF_VALUE: 21
PIF_VALUE: 25
PIF_VALUE: 18
PIF_VALUE: 21
PIF_VALUE: 20
PIF_VALUE: 24
PIF_VALUE: 20
PIF_VALUE: 20
PIF_VALUE: 5
PIF_VALUE: 21
PIF_VALUE: 24
PIF_VALUE: 21
PIF_VALUE: 21
PIF_VALUE: 20
PIF_VALUE: 21
PIF_VALUE: 1
PIF_VALUE: 20
PIF_VALUE: 20
PIF_VALUE: 19
PIF_VALUE: 21
PIF_VALUE: 0
PIF_VALUE: 21
PIF_VALUE: 19
PIF_VALUE: 18
PIF_VALUE: 22
PIF_VALUE: 20
PIF_VALUE: 20
PIF_VALUE: 16
PIF_VALUE: 20
PIF_VALUE: 5
PIF_VALUE: 20
PIF_VALUE: 21
PIF_VALUE: 15
PIF_VALUE: 20
PIF_VALUE: 21
PIF_VALUE: 21
PIF_VALUE: 20
PIF_VALUE: 0
PIF_VALUE: 20
PIF_VALUE: 21
PIF_VALUE: 20
PIF_VALUE: 20
PIF_VALUE: 15
PIF_VALUE: 20
PIF_VALUE: 22
PIF_VALUE: 20
PIF_VALUE: 20
PIF_VALUE: 21
PIF_VALUE: 20
PIF_VALUE: 18
PIF_VALUE: 20
PIF_VALUE: 20
PIF_VALUE: 18
PIF_VALUE: 21
PIF_VALUE: 20
PIF_VALUE: 20
PIF_VALUE: 18
PIF_VALUE: 20
PIF_VALUE: 21
PIF_VALUE: 20
PIF_VALUE: 20
PIF_VALUE: 22
PIF_VALUE: 20
PIF_VALUE: 21
PIF_VALUE: 18
PIF_VALUE: 6
PIF_VALUE: 1
PIF_VALUE: 21
PIF_VALUE: 1
PIF_VALUE: 21
PIF_VALUE: 5
PIF_VALUE: 21
PIF_VALUE: 20
PIF_VALUE: 24
PIF_VALUE: 21
PIF_VALUE: 5
PIF_VALUE: 21
PIF_VALUE: 22
PIF_VALUE: 19
PIF_VALUE: 20
PIF_VALUE: 19
PIF_VALUE: 20
PIF_VALUE: 20
PIF_VALUE: 21
PIF_VALUE: 20
PIF_VALUE: 21
PIF_VALUE: 21
PIF_VALUE: 20
PIF_VALUE: 21
PIF_VALUE: 21
PIF_VALUE: 20
PIF_VALUE: 21
PIF_VALUE: 21
PIF_VALUE: 20
PIF_VALUE: 20
PIF_VALUE: 18
PIF_VALUE: 20
PIF_VALUE: 21
PIF_VALUE: 21
PIF_VALUE: 18
PIF_VALUE: 15
PIF_VALUE: 24
PIF_VALUE: 21
PIF_VALUE: 23
PIF_VALUE: 21
PIF_VALUE: 20
PIF_VALUE: 2
PIF_VALUE: 19
PIF_VALUE: 21
PIF_VALUE: 20
PIF_VALUE: 21
PIF_VALUE: 23
PIF_VALUE: 20
PIF_VALUE: 20
PIF_VALUE: 21
PIF_VALUE: 20
PIF_VALUE: 18
PIF_VALUE: 21
PIF_VALUE: 21
PIF_VALUE: 1
PIF_VALUE: 22
PIF_VALUE: 20
PIF_VALUE: 20
PIF_VALUE: 21
PIF_VALUE: 21
PIF_VALUE: 20
PIF_VALUE: 20
PIF_VALUE: 5
PIF_VALUE: 21
PIF_VALUE: 20
PIF_VALUE: 18
PIF_VALUE: 20
PIF_VALUE: 20
PIF_VALUE: 21
PIF_VALUE: 21
PIF_VALUE: 20
PIF_VALUE: 20
PIF_VALUE: 21
PIF_VALUE: 20
PIF_VALUE: 21
PIF_VALUE: 20
PIF_VALUE: 20
PIF_VALUE: 19
PIF_VALUE: 20
PIF_VALUE: 23
PIF_VALUE: 20
PIF_VALUE: 21
PIF_VALUE: 21

## 2020-09-22 ASSESSMENT — PAIN SCALES - WONG BAKER: WONGBAKER_NUMERICALRESPONSE: 0

## 2020-09-22 ASSESSMENT — PAIN SCALES - GENERAL
PAINLEVEL_OUTOF10: 2
PAINLEVEL_OUTOF10: 4

## 2020-09-22 ASSESSMENT — PAIN - FUNCTIONAL ASSESSMENT: PAIN_FUNCTIONAL_ASSESSMENT: 0-10

## 2020-09-22 NOTE — OP NOTE
Operative Note      Patient: Dolores Huddleston  YOB: 1965  MRN: [id number]    Date of Procedure: 9/22/2020    Pre-Op Diagnosis: DJD BILATERAL KNEES    Post-Op Diagnosis: Same       Procedure(s):  KNEE TOTAL ARTHROPLASTY BILATERAL    Surgeon(s):  Hermelinda Shafer MD    Assistant:   Resident: DO Leydi Hurtado CST  Anesthesia: General    Estimated Blood Loss (mL): Minimal    Complications: None    Specimens:   ID Type Source Tests Collected by Time Destination   A : explanted hardware from left knee for gross exam only Hardware Joint, Knee SURGICAL PATHOLOGY Hermelinda Shafer MD 9/22/2020 1429        Implants:  Implant Name Type Inv.  Item Serial No.  Lot No. LRB No. Used Action   CEMENT BONE R 1X40 US Cement CEMENT BONE R 1X40 US  [employer] [id number] Left 2 Implanted   CEMENT BONE R 1X40 US Cement CEMENT BONE R 1X40 US  [employer] [id number] Right 1 Implanted   CEMENT BONE R 1X40 US Cement CEMENT BONE R 1X40 US  [employer] [id number] Right 1 Implanted   PLATE KNEE TIB TRAY CRUC COBLT 83MM Knee PLATE KNEE TIB TRAY CRUC COBLT 83MM  [employer] [id number] Left 1 Implanted   IMPL KNEE PATELLA COMP 3 PEG STD 40 Knee IMPL KNEE PATELLA COMP 3 PEG STD 40  [employer] [id number] Left 1 Implanted   IMPL KNEE FEM COMP VANGUARD LT 75MM Knee IMPL KNEE FEM COMP VANGUARD LT 75MM  [employer] [id number] Left 1 Implanted   IMPL KNEE BEARING E1 ANT STBLZD [id number] Knee IMPL KNEE BEARING E1 ANT STBLZD [id number]  [employer] [id number] Left 1 Implanted   PLATE KNEE TIB TRAY CRUC COBLT 83MM Knee PLATE KNEE TIB TRAY CRUC COBLT 83MM  [employer] [id number] Right 1 Implanted   IMPL KNEE PATELLA COMP 3 PEG STD 40 Knee IMPL KNEE PATELLA COMP 3 PEG STD 40  TK [employer] [id number] Right 1 Implanted   IMPL KNEE FEM INTERLOCK 75MM RT Knee IMPL KNEE FEM INTERLOCK 75MM RT  [employer] [id number] Right 1 Implanted   IMPL KNEE TIB BEARING VANGARD [id number] Knee IMPL KNEE TIB BEARING VANGARD [id number]  [employer] [id number] Right 1 Implanted Drains: * No LDAs found *    Findings: Severe degenerative joint disease both knees, removal tibial screw on left side sent a specimen    Detailed Description of Procedure:     Patient is a 47 y.o. male with a long standing history of bilateral DJD of the knee. Patient had previous ACL reconstruction of his left knee many years ago. Patient's x-rays revealed severe varus gonarthrosis both knees with 15 degree flexion traction on both sides. Patient has failed all types of conservative treatment included physical therapy, weight-loss counseling, NSAIDS, and injections. The patient has significant restriction of activities of daily living and/or work/job place abilities, and, therefore, has been counseled for TKA. Patient is aware of all the risks and benefits as highlighted in the surgical consent form. The patient was given 3 grams of Ancef in the holding area as well as an adductor canal block. The patient was then taken to the operating suite where general anesthesia was administered. A tourniquet was applied to effected leg and then prepped and draped in the usual sterile fashion. Time out was called to verify laterality. The left leg was examined   and the tourniquet inflated to 300 mm of Hg. Midline incision was utilized and taken down to the joint capsule where a mid-vastus approach to the knee was performed. After a complete synovectomy, the patella was elevated, calibrated for thickness, and the above sized, patellar triple pegged drills guide positioned medially and then drilled. Atrial patellar button was positioned in order to re-established the original thickness. This was then replaced with a protective patellar plate. The knee was then flexed and revealed significant  arthrosis. Osteophytes were removed via rongeur. A drill hole was made in the distal femur and the femoral canal was then irrigated and suctioned.  A fluted IM celestina was inserted and a distal femoral cut was made at 5 degrees of valgus at the +4 setting. The proximal tibia was then exposed after resection of the ACL and lateral meniscus. An extra-medullary tibial cutting jug was then aligned in reference to the tibia tubercle and ankle mortise and positional with a slight posterior slope. The cut was made with minimal cutting of the lowest depth of the tibial wear and the fragment removed. The distal femur was then approached and femoral sizing guide with 3 degrees of external rotation was placed flush with the surface and drill holes made and femoral size determined. The appropriate size cutting jig was then placed and anterior, posterior, and chamfer cuts made with an oscillating saw. A laminar  was inserted with care to avoid damaging the MCL. Posterior osteophytes were removed and the capsule stripped from the posterior femoral condyles. The capsule was then injected with the orthopedic cocktail at this time. The tibial cut was then assessed with a baseplate and alignment celestina to assure proper cut. Spacer blocks were then inserted and the knee was assessed to determine the amount of soft tissue release and osteophyte removal necessary  to establish symmetric flexion and extension gaps. The tibia was then elevated, and the above sized tibial plate was positioned for proper external rotation with an alignment celestina down the middle-third of the tibial tubercles. This was pinned in place, the proximal tibialis reamed, the fins were then repacked. The appropriate size femur was positioned and various size of the polyethylene trials were inserted. The knee was assessed for  ROM and patellar tracking. Satisfied with this, this distal femoral logs were drilled and then all the trial components were removed. The knee was irrigated and dried while the cement was prepared.  Cement was applied to to both implant and cut surfaces and the implants impacted and the compression with one size larger poly inserted

## 2020-09-22 NOTE — ANESTHESIA PRE PROCEDURE
Department of Anesthesiology  Preprocedure Note       Name:  Oswald Vázquez   Age:  47 y.o.  :  1965                                          MRN:  [id number]         Date:  2020      Surgeon: Keith Adkins):  Tez Costa MD    Procedure: Procedure(s):  KNEE TOTAL ARTHROPLASTY BILATERAL    Medications prior to admission:   Prior to Admission medications    Medication Sig Start Date End Date Taking? Authorizing Provider   calcium citrate-vitamin D (CALCIUM CITRATE + D) 315-200 MG-UNIT per tablet Take 2 tablets by mouth 3 times daily Takes 2 early Am   2 morning   1 evening   Yes Historical Provider, MD   Niacin (VITAMIN B-3 PO) Place 1,000 mg under the tongue daily   Yes Historical Provider, MD   Prenatal Multivit-Min-Fe-FA (PRENATAL 1 + IRON PO) Take 1 tablet by mouth daily   Yes Historical Provider, MD   acetaminophen (TYLENOL) 500 MG tablet Take 500 mg by mouth every 6 hours as needed for Pain   Yes Historical Provider, MD       Current medications:    Current Facility-Administered Medications   Medication Dose Route Frequency Provider Last Rate Last Dose    ceFAZolin (ANCEF) 3 g in dextrose 5 % 100 mL IVPB  3 g Intravenous On Call to [address] [place] [address] MD Wicho        scopolamine (TRANSDERM-SCOP) transdermal patch 1 patch  1 patch Transdermal Once Tez Costa MD   1 patch at 20 1209    lactated ringers infusion   Intravenous Continuous Juan A Slaughter  mL/hr at 20 1219         Allergies:  No Known Allergies    Problem List:  There is no problem list on file for this patient.       Past Medical History:        Diagnosis Date    Arthritis     Sleep apnea     does not use machine-has improved since gastric bypass       Past Surgical History:        Procedure Laterality Date    GASTRIC BYPASS SURGERY  2017    KNEE ARTHROSCOPY Left 2005    ACL repair    WISDOM TOOTH EXTRACTION         Social History:    Social History     Tobacco Use    Smoking status: Former Smoker     Last [address]    Drug/Infectious Status (If Applicable):  No results found for: HIV, HEPCAB    COVID-19 Screening (If Applicable):   Lab Results   Component Value Date    COVID19 Not Detected 09/18/2020         Anesthesia Evaluation  Patient summary reviewed and Nursing notes reviewed no history of anesthetic complications:   Airway: Mallampati: III  TM distance: >3 FB   Neck ROM: full  Mouth opening: > = 3 FB Dental:          Pulmonary:Negative Pulmonary ROS and normal exam  breath sounds clear to auscultation                             Cardiovascular:  Exercise tolerance: good (>4 METS),           Rhythm: regular  Rate: normal                    Neuro/Psych:   Negative Neuro/Psych ROS              GI/Hepatic/Renal:   (+) morbid obesity          Endo/Other:    (+) : arthritis: OA., .                 Abdominal:           Vascular:                                        Anesthesia Plan      general and regional     ASA 3     (Postop BILATERAL adductor canal block - r/b/a discussed including bleeding, infection, and nerve injury. Patient agrees to proceed with procedure.)  Induction: intravenous. MIPS: Postoperative opioids intended and Prophylactic antiemetics administered. Anesthetic plan and risks discussed with patient. Plan discussed with CRNA.                   Jaquelin Alas MD   9/22/2020

## 2020-09-22 NOTE — ANESTHESIA PROCEDURE NOTES
Peripheral Block    Patient location during procedure: PACU  Start time: 9/22/2020 4:40 PM  End time: 9/22/2020 5:05 PM  Staffing  Anesthesiologist: Tolu West MD  Preanesthetic Checklist  Completed: patient identified, site marked, surgical consent, pre-op evaluation, timeout performed, IV checked, risks and benefits discussed, monitors and equipment checked, anesthesia consent given, oxygen available and patient being monitored  Peripheral Block  Patient position: supine  Prep: ChloraPrep  Patient monitoring: cardiac monitor, continuous pulse ox, frequent blood pressure checks and IV access  Block type: Femoral  Laterality: bilateral  Injection technique: single-shot  Procedures: ultrasound guided  Local infiltration: lidocaine  Infiltration strength: 1 %  Dose: 5 mL  Adductor canal  Provider prep: mask and sterile gloves  Local infiltration: lidocaine  Needle  Needle type: pencil-tip   Needle gauge: 22 G  Needle length: 10 cm  Needle localization: ultrasound guidance  Test dose: negative  Assessment  Injection assessment: negative aspiration for heme, no paresthesia on injection and local visualized surrounding nerve on ultrasound  Paresthesia pain: none  Slow fractionated injection: yes  Hemodynamics: stable  Medications Administered  Bupivacaine (MARCAINE) PF injection 0.5%, 25 mL  lidocaine injection 2%, 20 mL  Reason for block: post-op pain management and at surgeon's request

## 2020-09-22 NOTE — ANESTHESIA POSTPROCEDURE EVALUATION
POST- ANESTHESIA EVALUATION       Pt Name: Gina Fairbanks  MRN: [id number]  [place]: 1965  Date of evaluation: 9/22/2020  Time:  5:28 PM      BP (!) 147/84   Pulse 91   Temp 97.3 °F (36.3 °C) (Infrared)   Resp 11   Ht 5' 11\" (1.803 m)   Wt 286 lb (129.7 kg)   SpO2 93%   BMI 39.89 kg/m²      Consciousness Level  Awake  Cardiopulmonary Status  Stable  Pain Adequately Treated YES  Nausea / Vomiting  NO  Adequate Hydration  YES  Anesthesia Related Complications NONE      Electronically signed by Shiloh Thomas MD on 9/22/2020 at 5:28 PM       Department of Anesthesiology  Postprocedure Note    Patient: Gina Fairbanks  MRN: [id number]  YOB: 1965  Date of evaluation: 9/22/2020  Time:  5:28 PM     Procedure Summary     Date:  09/22/20 Room / Location:  [address] [place] [address] Jarrod Law 03 / [facility]: [facility]    Anesthesia Start:  4511 Anesthesia Stop:  1644    Procedure:  KNEE TOTAL ARTHROPLASTY BILATERAL (Bilateral Knee) Diagnosis:  (DJD BILATERAL KNEES)    Surgeon:  Esther Francois MD Responsible Provider:  Shiloh Thomas MD    Anesthesia Type:  general, regional ASA Status:  3          Anesthesia Type: general, regional    Reji Phase I: Reji Score: 7    Reji Phase II:      Last vitals: Reviewed and per EMR flowsheets.        Anesthesia Post Evaluation

## 2020-09-23 LAB — SURGICAL PATHOLOGY REPORT: NORMAL

## 2020-09-23 PROCEDURE — 6370000000 HC RX 637 (ALT 250 FOR IP): Performed by: ORTHOPAEDIC SURGERY

## 2020-09-23 PROCEDURE — 97110 THERAPEUTIC EXERCISES: CPT

## 2020-09-23 PROCEDURE — 2580000003 HC RX 258: Performed by: ORTHOPAEDIC SURGERY

## 2020-09-23 PROCEDURE — 96366 THER/PROPH/DIAG IV INF ADDON: CPT

## 2020-09-23 PROCEDURE — 97530 THERAPEUTIC ACTIVITIES: CPT

## 2020-09-23 PROCEDURE — 99024 POSTOP FOLLOW-UP VISIT: CPT | Performed by: ORTHOPAEDIC SURGERY

## 2020-09-23 PROCEDURE — 97166 OT EVAL MOD COMPLEX 45 MIN: CPT

## 2020-09-23 PROCEDURE — 97535 SELF CARE MNGMENT TRAINING: CPT

## 2020-09-23 PROCEDURE — 97116 GAIT TRAINING THERAPY: CPT

## 2020-09-23 PROCEDURE — 97161 PT EVAL LOW COMPLEX 20 MIN: CPT

## 2020-09-23 PROCEDURE — 6360000002 HC RX W HCPCS: Performed by: ORTHOPAEDIC SURGERY

## 2020-09-23 PROCEDURE — G0378 HOSPITAL OBSERVATION PER HR: HCPCS

## 2020-09-23 RX ORDER — CYCLOBENZAPRINE HCL 10 MG
10 TABLET ORAL 3 TIMES DAILY PRN
Status: DISCONTINUED | OUTPATIENT
Start: 2020-09-23 | End: 2020-09-25 | Stop reason: HOSPADM

## 2020-09-23 RX ORDER — 0.9 % SODIUM CHLORIDE 0.9 %
1000 INTRAVENOUS SOLUTION INTRAVENOUS ONCE
Status: COMPLETED | OUTPATIENT
Start: 2020-09-23 | End: 2020-09-23

## 2020-09-23 RX ORDER — ASPIRIN/CALCIUM/MAG/ALUMINUM 325 MG
1 TABLET ORAL 2 TIMES DAILY
Qty: 60 TABLET | Refills: 3 | Status: SHIPPED | OUTPATIENT
Start: 2020-09-23

## 2020-09-23 RX ORDER — OXYCODONE HYDROCHLORIDE 10 MG/1
10 TABLET ORAL EVERY 4 HOURS PRN
Qty: 42 TABLET | Refills: 0 | Status: SHIPPED | OUTPATIENT
Start: 2020-09-23 | End: 2020-09-30

## 2020-09-23 RX ADMIN — SENNOSIDES AND DOCUSATE SODIUM 1 TABLET: 8.6; 5 TABLET ORAL at 20:12

## 2020-09-23 RX ADMIN — ACETAMINOPHEN 650 MG: 325 TABLET, FILM COATED ORAL at 21:57

## 2020-09-23 RX ADMIN — Medication 10 ML: at 20:12

## 2020-09-23 RX ADMIN — OXYCODONE HYDROCHLORIDE 10 MG: 10 TABLET ORAL at 15:39

## 2020-09-23 RX ADMIN — SODIUM CHLORIDE 1000 ML: 9 INJECTION, SOLUTION INTRAVENOUS at 08:36

## 2020-09-23 RX ADMIN — ASPIRIN 325 MG: 325 TABLET, COATED ORAL at 07:40

## 2020-09-23 RX ADMIN — SENNOSIDES AND DOCUSATE SODIUM 1 TABLET: 8.6; 5 TABLET ORAL at 07:40

## 2020-09-23 RX ADMIN — CEFAZOLIN 3 G: 1 INJECTION, POWDER, FOR SOLUTION INTRAMUSCULAR; INTRAVENOUS at 05:53

## 2020-09-23 RX ADMIN — ACETAMINOPHEN 650 MG: 325 TABLET, FILM COATED ORAL at 04:03

## 2020-09-23 RX ADMIN — ACETAMINOPHEN 650 MG: 325 TABLET, FILM COATED ORAL at 09:59

## 2020-09-23 RX ADMIN — Medication 10 ML: at 07:40

## 2020-09-23 RX ADMIN — ACETAMINOPHEN 650 MG: 325 TABLET, FILM COATED ORAL at 15:39

## 2020-09-23 RX ADMIN — OXYCODONE HYDROCHLORIDE 10 MG: 10 TABLET ORAL at 20:29

## 2020-09-23 RX ADMIN — CYCLOBENZAPRINE 10 MG: 10 TABLET, FILM COATED ORAL at 20:11

## 2020-09-23 RX ADMIN — ASPIRIN 325 MG: 325 TABLET, COATED ORAL at 20:11

## 2020-09-23 ASSESSMENT — PAIN DESCRIPTION - LOCATION
LOCATION: KNEE

## 2020-09-23 ASSESSMENT — PAIN DESCRIPTION - PROGRESSION
CLINICAL_PROGRESSION: GRADUALLY WORSENING

## 2020-09-23 ASSESSMENT — PAIN DESCRIPTION - PAIN TYPE
TYPE: ACUTE PAIN;SURGICAL PAIN
TYPE: SURGICAL PAIN

## 2020-09-23 ASSESSMENT — PAIN DESCRIPTION - DESCRIPTORS
DESCRIPTORS: ACHING;SORE
DESCRIPTORS: DISCOMFORT
DESCRIPTORS: ACHING;DISCOMFORT;SORE
DESCRIPTORS: DISCOMFORT
DESCRIPTORS: DISCOMFORT;SORE
DESCRIPTORS: DISCOMFORT;SORE

## 2020-09-23 ASSESSMENT — PAIN SCALES - GENERAL
PAINLEVEL_OUTOF10: 6
PAINLEVEL_OUTOF10: 7
PAINLEVEL_OUTOF10: 2
PAINLEVEL_OUTOF10: 6
PAINLEVEL_OUTOF10: 0
PAINLEVEL_OUTOF10: 9
PAINLEVEL_OUTOF10: 3
PAINLEVEL_OUTOF10: 3
PAINLEVEL_OUTOF10: 6
PAINLEVEL_OUTOF10: 0
PAINLEVEL_OUTOF10: 3
PAINLEVEL_OUTOF10: 0
PAINLEVEL_OUTOF10: 9

## 2020-09-23 ASSESSMENT — PAIN DESCRIPTION - ORIENTATION
ORIENTATION: RIGHT;LEFT
ORIENTATION: LEFT
ORIENTATION: LEFT;RIGHT
ORIENTATION: LEFT
ORIENTATION: RIGHT;LEFT
ORIENTATION: LEFT

## 2020-09-23 ASSESSMENT — PAIN DESCRIPTION - FREQUENCY
FREQUENCY: CONTINUOUS
FREQUENCY: INTERMITTENT
FREQUENCY: INTERMITTENT
FREQUENCY: CONTINUOUS

## 2020-09-23 ASSESSMENT — PAIN - FUNCTIONAL ASSESSMENT
PAIN_FUNCTIONAL_ASSESSMENT: PREVENTS OR INTERFERES SOME ACTIVE ACTIVITIES AND ADLS
PAIN_FUNCTIONAL_ASSESSMENT: PREVENTS OR INTERFERES WITH ALL ACTIVE AND SOME PASSIVE ACTIVITIES

## 2020-09-23 ASSESSMENT — PAIN DESCRIPTION - ONSET
ONSET: ON-GOING

## 2020-09-23 NOTE — PROGRESS NOTES
Post Operative Progress Note    9/23/2020 12:52 PM  Info:   Admit Date: 9/22/2020  PCP: Elvira Davidson MD      Medications:   Scheduled Meds:   sodium chloride flush  10 mL Intravenous 2 times per day    acetaminophen  650 mg Oral Q6H    sennosides-docusate sodium  1 tablet Oral BID    aspirin  325 mg Oral BID     Continuous Infusions:   lactated ringers 125 mL/hr at 09/22/20 1952     PRN Meds:sodium chloride flush, oxyCODONE **OR** oxyCODONE, HYDROmorphone **OR** HYDROmorphone, magnesium hydroxide    Diet:   Diet: DIET GENERAL;    Subjective:   Systemic or Specific Complaints: LH this am,better now.  Left knee painful, right none    Objective:     Patient Vitals for the past 24 hrs:   BP Temp Temp src Pulse Resp SpO2 Weight   09/23/20 1245 113/64 98.3 °F (36.8 °C) Oral 95 18 100 % --   09/23/20 1045 (!) 103/59 98.4 °F (36.9 °C) Oral 82 18 99 % --   09/23/20 1002 108/65 98.2 °F (36.8 °C) Oral 89 18 98 % --   09/23/20 0850 126/62 -- -- -- -- 97 % --   09/23/20 0830 111/61 98.2 °F (36.8 °C) Oral 87 18 99 % --   09/23/20 0816 (!) 92/58 -- -- -- -- -- --   09/23/20 0800 (!) 76/46 -- -- -- -- -- --   09/23/20 0500 -- -- -- -- -- 99 % 295 lb 6.7 oz (134 kg)   09/22/20 2350 117/65 97.9 °F (36.6 °C) Oral 97 18 99 % --   09/22/20 2125 113/67 98.1 °F (36.7 °C) Oral 97 17 94 % --   09/22/20 1757 137/79 97.7 °F (36.5 °C) Oral 94 16 94 % --   09/22/20 1735 -- -- -- 91 10 94 % --   09/22/20 1730 137/86 -- -- 93 10 92 % --   09/22/20 1720 (!) 147/84 -- -- 91 11 93 % --   09/22/20 1710 135/87 -- -- 95 10 100 % --   09/22/20 1700 136/89 -- -- 92 10 99 % --   09/22/20 1650 (!) 151/92 -- -- 100 11 99 % --   09/22/20 1640 (!) 147/79 -- -- 95 10 98 % --   09/22/20 1639 (!) 147/79 97.3 °F (36.3 °C) Infrared 94 12 99 % --         Wound: incision clean and dry without sign of infection   Motion: expected discomfort with range of motion in affected extremity   DVT Exam:  no evidence of DVT on physical examination         Data Review  CBC: No results for input(s): WBC, HGB, PLT in the last 72 hours. Assessment:   Patient is S/P bilateral Knee, Arthoplasty  Pain is well controlled. He has no nausea and no vomiting.     Current activity is up with assistance        Plan:      1:  Continue Physical Therapy  2:  Continue Deep venous thrombosis prophylaxis  3:  Continue Pain Control  4:  Discharge plans home, likely tomorrow       Electronically signed by Yue Grayson MD on 9/23/2020 at 12:52 PM

## 2020-09-23 NOTE — CARE COORDINATION
[facility] [address] Encounter Date/Time: 2020 [address] [facility] Account: [de-identified]    MRN: [id number]    Patient: Drake Merrill    Contact Serial #: [id number]      ENCOUNTER          Patient Class: Observation Private Enc? No Unit RM BD: [address]    Hospital Service: Med/Surg   ADM DX: Primary osteoarthritis o*   ADM Provider: Mary Holloway MD   Procedure: MN TOTAL KNEE ARTHROPLAS*   ATT Provider: Mary Holloway MD   REF Provider:        PATIENT                 Name: Drake Merrill : 1965 (47 yrs)   Address: [place] [address] Sex: Male   City: [place] [address]         Marital Status:    Employer: [employer]         Mormon: None   Primary Care Provider: Kevin Faria MD         Primary Phone: [phone]   EMERGENCY CONTACT   Contact Name Legal Guardian? Relationship to Patient Home Phone Work Phone   1. Mariaelena Park  2. Leela Alba    No Spouse  Child [phone]            GUARANTOR            Guarantor: Drake Merrill     : 1965   Address: [address] [place] [address] Sex: [place]     [place] [address]     Relation to Patient: Self       Home Phone: [phone]   Guarantor ID: [id number]       Work Phone:     Guarantor Employer: [employer]         Status: FULL TIME      COVERAGE        PRIMARY INSURANCE   Payor: [facility] BENEFIT Plan: Janet Cristobal   Payor Address: [address]       Group Number: [id number] Insurance Type: INDEMNITY   Subscriber Name: Mario Bhat : 1965   Subscriber ID: [id number] Pat. Rel. to Sub: Self   SECONDARY INSURANCE   Payor:   Plan:     Payor Address:  ,           Group Number:   Insurance Type:     Subscriber Name:   Subscriber :     Subscriber ID:   Pat.  Rel. to Sub:             CSN                                    Req/Control # [Problem retrieving Specimen ID] Order Date:  Sep 23, 2020  [id number]                                          Patient Information      Name:  Dolores Huddleston  :  1965  Age:  47 y.o. Address:  [address] [place] [address], [address] [place] [address]   Zip:  [address]  PCP: Paco Plasencia MD Sex:  M  SSN: [id number]  Home Phone: [phone]  Work Phone:  [phone]  Patient MRN:  [id number]    Alt Patient ID:  [id number]  PCP Phone: [phone]       Authorizing Provider Information       AUTHORIZING PROVIDER: Hermelinda Shafer MD  Physician ID: [id number]  NPI:  [id number]  Site:   Address: [address] [place] [address] AT THE [address]  Phone: [phone]  Fax: [phone]               EQUIPMENT ORDERED  DME Order for [facility]   #:   [id number]) Priority  Routine Class  Hospital Performed        Associated Diagnosis:  Primary osteoarthritis of knees, bilateral (M17.0 [ICD-10-CM])        Comments:    You must complete the order parameters below and add the medical necessity documentation for this DME in a separate note.     Folding Walker with Wheels     Current patient weight: Weight: 295 lb 6.7 oz (134 kg)  Current patient height: Height: 5' 11\" (180.3 cm)  Diagnosis: unsteady gait, s/p bilateral total knee arthroplasty  Duration: Purchase            Scheduling Instructions:                                 Specimen Source             Collection Date    Collection Time    Order Status    Expected Date                Electronically Signed By  Hermelinda Shafer MD Date  Sep 23, 2020           Responsible Party [facility]   Relationship Account Type Home Phone   [address] / [place], [address] [place] [address] Self P/F [phone]   Employer   Work Phone   [email or web address]              [address]     Primary Insurance  Insurance/Subscriber ID:  [id number]  Subscriber Name:  Agusto Vegas              Relationship to Patient: SelfSigned ABN: N    Payor Name:  Sarai Bassett BENEFIT   Plan:  Corina Mackenzie   Group: [id number]  Worker's Comp Date of Injury:

## 2020-09-23 NOTE — CARE COORDINATION
Joint Replacement Discharge Planning Note:    Admission Date:  9/22/2020 Reji Hartman is a 47 y.o.  male    Admitted for : Primary osteoarthritis of knees, bilateral [M17.0]    Met with:  Patient    PCP:  Ricco Mueller MD              Insurance:  [employer]    Current Residence/ Living Arrangements:  independently at home with spouse           Current Services PTA:  No    Is patient agreeable to [address] [employer]: Yes    Is patient agreeable to outpatient physical therapy:  eventually    Freedom of choice provided: Yes         [address] [employer] Agency/Outpatient Therapy chosen: [facility]. Notified Kell Lawson from [facility] of referral and faxed face sheet to [address] [place] [address] needed: No    Current home DME:  none    Pharmacy:  [facility]    Does Patient want to use MEDS to BEDS?([facility] only) No    Transportation Provider:  Family                       Discharge Plan:   Patient intends to discharge to Home    Patient needs a wheeled walker. Order for walker along with face sheet faxed to [facility] [employer] and asked for delivery today    Anticipated discharge date 9/23/2020      Readmission Risk              Risk of Unplanned Readmission:        0           Electronically signed by: Luba Choudhary RN on 9/23/2020 at 9:35 AM    Received call from [facility] [employer] stating that pt's insurance does NOT cover a walker. Notified pt of this and that he will need family to purchase a front wheeled walker for him prior to discharge. He verbalized understanding.     Electronically signed by Luba Choudhary RN on 9/23/2020 at 11:12 AM

## 2020-09-23 NOTE — DISCHARGE INSTR - COC
Continuity of Care Form    Patient Name: Annalisa Olvera   :  1965  MRN:  [id number]    Admit date:  2020  Discharge date:  2020    Code Status Order: Full Code   Advance Directives:      Admitting Physician:  Lorena Morse MD  PCP: Rachna Gomez MD    Discharging Nurse: THE [facility] Unit/Room#: 2037/2037-01  Discharging Unit Phone Number: [phone]    Emergency Contact:   Extended Emergency Contact Information  Primary Emergency Contact: [facility]  Address: [address] Phone: [phone]  Work Phone: [phone]  Mobile Phone: [phone]  Relation: Spouse  Secondary Emergency Contact: Lia Mcneill  Home Phone: [phone]  Relation: Child   needed? No    Past Surgical History:  Past Surgical History:   Procedure Laterality Date    GASTRIC BYPASS SURGERY  2017    KNEE ARTHROSCOPY Left 2005    ACL repair    WISDOM TOOTH EXTRACTION         Immunization History: There is no immunization history on file for this patient.     Active Problems:  Patient Active Problem List   Diagnosis Code    Primary osteoarthritis of knees, bilateral M17.0       Isolation/Infection:   Isolation            No Isolation          Patient Infection Status       Infection Onset Added Last Indicated Last Indicated By Review Planned Expiration Resolved Resolved By    None active    Resolved    COVID-19 Rule Out 20 Covid-19 Ambulatory (Ordered)   20 Rule-Out Test Resulted            Nurse Assessment:  Last Vital Signs: /65   Pulse 97   Temp 97.9 °F (36.6 °C) (Oral)   Resp 18   Ht 5' 11\" (1.803 m)   Wt 295 lb 6.7 oz (134 kg)   SpO2 99%   BMI 41.20 kg/m²     Last documented pain score (0-10 scale): Pain Level: 2  Last Weight:   Wt Readings from Last 1 Encounters:   20 295 lb 6.7 oz (134 kg)     Mental Status:  oriented and alert    IV Access:  - None    Nursing Mobility/ADLs:  Walking   Assisted  Transfer  Assisted  Bathing  Assisted  Dressing  Assisted  Toileting  Independent  Feeding  Independent  Med Admin  Independent  Med Delivery   whole    Safety Concerns: At Risk for Falls    Impairments/Disabilities:      None    Nutrition Therapy:  Current Nutrition Therapy:   - Oral Diet:  General    Routes of Feeding: Oral  Liquids: No Restrictions  Daily Fluid Restriction: no  Last Modified Barium Swallow with Video (Video Swallowing Test): not done    Treatments at the Time of Hospital Discharge:   Respiratory Treatments: n/a  Oxygen Therapy:  is not on home oxygen therapy. Ventilator:    - No ventilator support    Rehab Therapies: Physical Therapy and Occupational Therapy  Weight Bearing Status/Restrictions: No weight bearing restirctions  Other Medical Equipment (for information only, NOT a DME order):  walker  Other Treatments: skilled nursing assessment, medication education and monitoring  TOTAL JOINT [address]  This patient is a post-operative total joint replacement patient. Expectations for this patients care are as follows:    Initial RN Visit to establish care and verify patient meds plus daily PT for two weeks. Goals:   DailyTherapy for rehabilitative purposes for two weeks.  Increased level of activity and ambulation each day.  Well-controlled pain.  Free from infection.  Encourage patient to provide self-care when possible.  Ambulation with a rolling walker. Activity & Diet:   Therapy performed daily. (Instruct patient to take pain meds. 1 hour prior to therapy.)   Up in chair for all meals and majority of day.  Range of motion for TKA patients.  Hip precautions for PRIYANK patients.    Incentive Spirometer: 3- 4 inhalations every twenty minutes, during the day, while awake, the first week home after discharge   Increase oral intake of fruits, fiber and water and take a daily stool softener to prevent constipation.  Consider stronger bowel protocol if no bowel movement is achieved after three days home.  Increase protein intake and reduce sugar intake to promote healing and prevent infection.  No pillow under the knee for TKA patients. [address] [place] go on in the a.m. and come off in the p.m. (Hand wash them every two or three days, roll in towel to remove most of moisture, and hang to dry.)    Incision Care:   Keep incisional dressing intact until seen and removed by surgeon, unless saturated, in which case, call surgeon and request instructions.  If dressing falls off, call surgeon.  If using the Prevena dressing, with battery pack, place battery pack in waterproof bag during shower. If using Aquacel dressing then dressing is waterproof and patient may shower.  Elevated the leg and ice the affected area four times a day, for twenty minutes each time and after every physical therapy session. Normal Conditions - Will improve with provided comfort measures and time:   Some swelling in the operative leg is normal - this should reduce over time.  Some post-operative pain is normal.  This will improve with prescribed medication, provided comfort measures and time.  Constipation related to pain medications & decreased mobility is a common occurrence. (Increase your fiber & water intake and take a stool softener.)    Slight warmth of operative site is normal and will diminish with time.  Fatigue and moderate pain after therapy is normal and will improve with time.  Numbness near the incision site is normal and will improve with time.  NOTE: Ensure/Remind patient to go to their follow-up appointment with their orthopedic surgeon, which is scheduled: [email or web address]    Abnormal Conditions - When to call the Surgeon:   Increasing/excessive redness, warmth or swelling at the incisional site not relieved with ice and elevation.    Increasing/excessive pain not well-controlled by prescribed medications.  Drainage or odor from or around the incision site.  (A little blood showing through the bandage is ok, but active leaking, of any color, coming out of the bandage is NOT normal.)   Temperature above 101 degrees. (A mild temp of 99 - 100 is normal - if temp gets to 101 you may use Tylenol once - if it does not improve, you may use a 2nd dose of Tylenol after 5 hours - if temperature is still at or above 101 degrees then call the surgeon.)   Calf tenderness, swelling, or redness or numbness of the foot/lower leg.  Shortness of breath or chest pain.  Any other incision or surgical-related concerns.  CALL SURGEON with concerns PRIOR TO sending patient to hospital.        Patient's personal belongings (please select all that are sent with patient):  None    RN SIGNATURE:  Electronically signed by Julee Flannery RN on 9/24/20 at 9:05 AM EDT    CASE MANAGEMENT/SOCIAL WORK SECTION    Inpatient Status Date: observation    Readmission Risk Assessment Score:  Readmission Risk              Risk of Unplanned Readmission:        0           Discharging to Facility/ [address]  Phone: [phone]  · Fax [phone]      / signature: Electronically signed by Julee Flannery RN on 9/24/20 at 9:04 AM EDT    PHYSICIAN SECTION    Prognosis: Fair    Condition at Discharge: Stable    Rehab Potential (if transferring to Rehab): Fair    Recommended Labs or Other Treatments After Discharge: see above    Physician Certification: I certify the above information and transfer of Iggy Joshi  is necessary for the continuing treatment of the diagnosis listed and that he requires Home Care for less 30 days.      Update Admission H&P: No change in H&P    PHYSICIAN SIGNATURE:  Electronically signed by Lorena Mathews MD on 9/23/20 at 12:53 PM EDT

## 2020-09-23 NOTE — PROGRESS NOTES
Physical Therapy  Facility/Department: [facility] MED SURG  Daily Treatment Note  NAME: Kristen Andujar  : 1965  MRN: [id number]    Date of Service: 2020    Discharge Recommendations: The patient would benefit from additional Physical  Therapy after discharge from the facility upon return to their Home. PT Equipment Recommendations  Equipment Needed: Yes  Mobility Devices: Gustavo Ted; ADL AssistiveDevices  Walker: Rolling  ADL Assistive Devices: Grab Bars - shower;Grab Bars - toilet    Assessment   Body structures, Functions, Activity limitations: Decreased functional mobility ; Decreased ADL status; Decreased ROM; Decreased strength;Decreased endurance;Decreased balance; Increased pain;Decreased safe awareness  Assessment: Pt requiring 1 assist for safety. Pt able to perform transfers and ambulation with HEP program given today. Pt has slight improved BPs with compression stockings donned. Pt will benefit from continued PT prior to returning home. Treatment Diagnosis: Impaired functional mobility 2* OA bilateral knees  Specific instructions for Next Treatment: Knee ROM flexion> extension, monitor BP prn, progress transfers/gait, stairs with family training, HEP  Prognosis: Good  Decision Making: Low Complexity  History: Pt underwent bilateral TKA on 20 by Dr Brennan Vera  Exam: ROM, MMT, bed mobility, sitting balance, activity tolerance  Clinical Presentation: Pt alert, pleasant, cooperative. Barriers to Learning: pain, safety awareness  REQUIRES PT FOLLOW UP: Yes  Activity Tolerance  Activity Tolerance: Patient limited by pain;Treatment limited secondary to medical complications (free text)(lightheadedness)     Patient Diagnosis(es): The encounter diagnosis was Primary osteoarthritis of knees, bilateral.     has a past medical history of Arthritis and Sleep apnea. has a past surgical history that includes Gastric bypass surgery (); Knee arthroscopy (Left, );  [place] tooth extraction; and REPLACEMENT TOTAL KNEE BILATERAL (Bilateral, 2020). Restrictions  Restrictions/Precautions  Restrictions/Precautions: General Precautions, Surgical Protocols, Fall Risk, Weight Bearing  Required Braces or Orthoses?: No  Implants present? : Metal implants(B TKA)  Lower Extremity Weight Bearing Restrictions  Right Lower Extremity Weight Bearing: Weight Bearing As Tolerated(Full WBAT)  Left Lower Extremity Weight Bearing: Weight Bearing As Tolerated(Full WBAT)  Position Activity Restriction  Other position/activity restrictions: Activity orders per Dr Mayuri Andrew  Chart Reviewed: Yes  Additional Pertinent Hx: arthritis, gastric bypass  Response To Previous Treatment: Patient with no complaints from previous session. Family / Caregiver Present: No  Referring Practitioner: George Dennison MD  Subjective  Subjective: Pt in bed, IV removed. REGGIE Whitlock PT PM session.   Pain Screening  Patient Currently in Pain: Yes  Pain Assessment  Pain Assessment: 0-10  Pain Level: 6(L>R)  Pain Type: Acute pain;Surgical pain  Pain Location: Knee  Pain Orientation: Left;Right  Pain Descriptors: Discomfort  Pain Frequency: Intermittent(worse with any movement)  Pain Onset: On-going  Clinical Progression: Gradually worsening  Functional Pain Assessment: Prevents or interferes with all active and some passive activities  Non-Pharmaceutical Pain Intervention(s): Ambulation/Increased Activity;Cold applied;Distraction;Elevation;Repositioned;Rest;Other (Comment)(compression stockings applied)  Response to Pain Intervention: Patient Satisfied  Vital Signs  BP: 116/69  Patient Position: Semi fowlers  Orthostatic B/P and Pulse?: Yes  Blood Pressure Lyin/62  Pulse Lyin PER MINUTE  Blood Pressure Sittin/60  Pulse Sittin PER MINUTE  Blood Pressure Standing: (MARK)  Patient Currently in Pain: Yes  Orthostatic B/P and Pulse?: Yes  Oxygen Therapy  SpO2: 97 %  O2 Device: None (Room air)  Patient Observation  Observations: bilateral knee bandages, BP increased to 104/57 after donning compression stockings (sitting in recliner upright)       Orientation  Orientation  Overall Orientation Status: Within Normal Limits  Cognition      Objective   Bed mobility  Rolling to Right: Stand by assistance  Supine to Sit: Stand by assistance  Sit to Supine: Unable to assess  Scooting: Stand by assistance  Comment: Head of bed slightly elevated, minimal cues needed. Increased time to sit edge of bed. Increased pain reported with B knee movements. Pt up in recliner chair B ice packs on knees (cryocuff education given), B LE elevated. Transfers  Sit to Stand: Minimal Assistance  Stand to sit: Minimal Assistance  Comment: Min x1 transfering from bed, toilet and chair using RW with increased time to stand due to increased pain. Mild lighthheadedness reported. Ambulation  Ambulation?: Yes  WB Status: Full WBAT B LE  Ambulation 1  Surface: level tile  Device: Rolling Walker  Assistance: Contact guard assistance  Quality of Gait: B knees flexed, slow cassie, forward flexed trunk, small steps  Gait Deviations: Decreased step length; Slow Cassie  Distance: 20', 15'x2  Comments: Mild lightheadedness reported  - BPs monitored throughout. Stairs/Curb  Stairs?: No     Balance  Posture: Good  Sitting - Static: Good  Sitting - Dynamic: Good;-  Standing - Static: Good;-;Fair;+  Standing - Dynamic: Fair  Comments: Fall risk, standing balance with RW  Other exercises  Other exercises?: Yes  Other exercises 1: HEP with handout - x10 reps, stretches performed with 30 second holds  Other exercises 2:  Ice pack/cryocuff education  Other exercises 3: Education on positioning, extension/flexion exercises x10-15 reps every 1 hour   AROM RLE (degrees)  RLE AROM: WFL  RLE General AROM: Knee AROM: 0-86  AROM LLE (degrees)  LLE AROM : WFL  LLE General AROM: Knee AROM: 4-76, increased pain  AROM RUE (degrees)  RUE General AROM: See OT  AROM LUE (degrees)  LUE General AROM: See OT  Strength RLE  Strength RLE: WFL  Comment: Grossly 3+ to 4-/5  Strength LLE  Strength LLE: WFL  Comment: Grossly 3+ to 4-/5  Strength RUE  Comment: See OT  Strength LUE  Comment: See OT  Comment: rest breaks PRN     AM-PAC Score  AM-PAC Inpatient Mobility Raw Score : 10 (09/23/20 0850)  AM-PAC Inpatient T-Scale Score : 32.29 (09/23/20 0850)  Mobility Inpatient CMS 0-100% Score: 76.75 (09/23/20 0850)  Mobility Inpatient CMS G-Code Modifier : CL (09/23/20 0850)          Goals  Short term goals  Time Frame for Short term goals: 2-3 treatments  Short term goal 1: Pt to demonstrate Mod I bed mobility. Short term goal 2: Pt to demonstrate SBA transfers. Short term goal 3: Pt to amb 50'-100' SBA/CGA. Short term goal 4: Pt to ascend/descend 3 stairs with family training, CGA. Short term goal 5: Pt to demonstrate good technique for HEP. Short term goal 6: Pt to improve knee AROM LLE: 0-85 and R LE 0-90 to improve ease of mobility. Short term goal 7: Pt to reduce pain to 2-3/10 to improve independence for safe mobility. Patient Goals   Patient goals : To go home    Plan    Plan  Times per week: BID  Specific instructions for Next Treatment: Knee ROM flexion> extension, monitor BP prn, progress transfers/gait, stairs with family training, HEP  Current Treatment Recommendations: Strengthening, ROM, Balance Training, Functional Mobility Training, Transfer Training, Endurance Training, Gait Training, Stair training, Equipment Evaluation, Education, & procurement, Patient/Caregiver Education & Training, Safety Education & Training, Home Exercise Program, Positioning, Pain Management  Safety Devices  Type of devices:  All fall risk precautions in place, Call light within reach, Gait belt, Patient at risk for falls, Nurse notified, Left in chair(RN [address])  Restraints  Initially in place: No     Therapy Time   Individual Concurrent Group Co-treatment   Time In 1320         Time Out 1410 Minutes 50         Timed Code Treatment Minutes: [address] [facility],

## 2020-09-23 NOTE — ANESTHESIA POSTPROCEDURE EVALUATION
Department of Anesthesiology  Postprocedure Note    Patient: Shireen Sanabria  MRN: [id number]  YOB: 1965  Date of evaluation: 9/23/2020  Time:  10:42 AM     Procedure Summary     Date:  09/22/20 Room / Location:  [address] / [address] [facility]     Anesthesia Start:  7241 Anesthesia Stop:  1644    Procedure:  KNEE TOTAL ARTHROPLASTY BILATERAL (Bilateral Knee) Diagnosis:  (DJD BILATERAL KNEES)    Surgeon:  Jewels Cristina MD Responsible Provider:  Bc Mcgarry MD    Anesthesia Type:  general, regional ASA Status:  3          Anesthesia Type: general, regional    Reji Phase I: Reji Score: 9    Reji Phase II:      Last vitals: Reviewed and per EMR flowsheets. Anesthesia Post Evaluation    Comments: POD #1. Patient seen at bedside. No anesthesia complications reported. Had episode of dizziness this morning after getting up to use the bathroom. Currently feels fine.

## 2020-09-23 NOTE — PROGRESS NOTES
Patient wondering if it would be okay if he could have a muscle relaxer, he stated that he took flexeril in the past and it helped. Spoke to Dr. Ruben Hagan. Order received and placed for Flexeril 10mg TID PRN.

## 2020-09-23 NOTE — PLAN OF CARE
Problem: Falls - Risk of:  Goal: Will remain free from falls  Description: Will remain free from falls  9/23/2020 1837 by Amarilys Christianson RN  Outcome: Ongoing  Note: Patient remains free of falls and injuries throughout shift. Bed remains in the lowest position, wheels locked, call light and bedside table are within reach. 9/23/2020 0516 by Kingsley Gilbret RN  Outcome: Ongoing  Goal: Absence of physical injury  Description: Absence of physical injury  9/23/2020 1837 by Amarilys Christianson RN  Outcome: Ongoing  9/23/2020 0516 by Kingsley Gilbert RN  Outcome: Ongoing     Problem: Pain:  Goal: Pain level will decrease  Description: Pain level will decrease  Outcome: Ongoing  Note: Pain is well controlled with current regimen. See MAR.   Goal: Control of acute pain  Description: Control of acute pain  Outcome: Ongoing  Goal: Control of chronic pain  Description: Control of chronic pain  Outcome: Ongoing     Problem: Musculor/Skeletal Functional Status  Goal: Highest potential functional level  Outcome: Ongoing  Goal: Absence of falls  Outcome: Ongoing

## 2020-09-23 NOTE — PROGRESS NOTES
Physical Therapy    Facility/Department: [facility] MED SURG  Initial Assessment    NAME: Tom Fortune  : 1965  MRN: [id number]    Date of Service: 2020    Discharge Recommendations:  Continue to assess pending progress  The patient would benefit from additional Physical  Therapy after discharge from the facility upon return to their Home. PT Equipment Recommendations  Equipment Needed: Yes  Mobility Devices: Vernon Molt; ADL AssistiveDevices  Walker: Rolling  ADL Assistive Devices: Grab Bars - shower    Assessment   Body structures, Functions, Activity limitations: Decreased functional mobility ; Decreased ADL status; Decreased ROM; Decreased strength;Decreased endurance;Decreased balance; Increased pain  Assessment: Pt requiring 1 assist for bed mobility. Pt dangled edge of bed. Unable to assess transfers/amb/stairs - will assess in PM. Pt having symptomatic hypotension and not feeling well enough to attempt standing or stay in seated chair. Pt will benefit from continued PT prior to returning home in addition to after d/c. Treatment Diagnosis: Impaired functional mobility 2* OA bilateral knees  Specific instructions for Next Treatment: monitor BP prn, progress transfers/gait, stairs with family training, HEP  Prognosis: Good  Decision Making: Low Complexity  History: Pt underwent bilateral TKA on 20 by Dr Lillie Andrews  Exam: ROM, MMT, bed mobility, sitting balance, activity tolerance  Clinical Presentation: Pt alert, pleasant, cooperative. Pt not feeling wel due to symptomatic hypotension. Barriers to Learning: pain  REQUIRES PT FOLLOW UP: Yes  Activity Tolerance  Activity Tolerance: Treatment limited secondary to medical complications (free text); Patient limited by endurance(symptomatic hypotension)       Patient Diagnosis(es): The encounter diagnosis was Primary osteoarthritis of knees, bilateral.     has a past medical history of Arthritis and Sleep apnea.    has a past surgical history that includes Signs  BP: 116/69  Patient Position: Semi fowlers  Orthostatic B/P and Pulse?: Yes  Blood Pressure Lyin/62  Pulse Lyin PER MINUTE  Blood Pressure Sittin/60  Pulse Sittin PER MINUTE  Blood Pressure Standing: (MARK)  Patient Currently in Pain: Yes  Orthostatic B/P and Pulse?: Yes  Oxygen Therapy  SpO2: 97 %  O2 Device: None (Room air)  Patient Observation  Observations: bilateral knee bandages, BP increased to 104/57 after donning compression stockings (sitting in recliner upright)       Orientation  Orientation  Overall Orientation Status: Within Normal Limits  Social/Functional History  Social/Functional History  Lives With: Family(wife, 2 sons, 2 daughters, mother)  Type of Home: House  Home Layout: Two level, Able to Live on Main level with bedroom/bathroom  Home Access: Stairs to enter with rails  Entrance Stairs - Number of Steps: 3  Entrance Stairs - Rails: Both(can reach both)  Bathroom Shower/Tub: Tub/Shower unit, Curtain, Shower chair with back  [employer]: Handicap height  Bathroom Equipment: Shower chair, Hand-held shower(sink next to toilet)  Bathroom Accessibility: Accessible, Walker accessible  Home Equipment: (no DME)  ADL Assistance: Independent  Homemaking Assistance: (wife does)  Homemaking Responsibilities: No  Ambulation Assistance: Independent  Transfer Assistance: Independent  Active : Yes  Mode of Transportation: Car, Truck  Occupation: Full time employment  Type of occupation: [employer] [place]  IADL Comments: sleeps in flat bed  Additional Comments: Wife works full time days. Wife will be available first few days, kids will also be there.    Cognition        Objective          AROM RLE (degrees)  RLE AROM: WFL  RLE General AROM: Knee AROM: 0-86  AROM LLE (degrees)  LLE AROM : WFL  LLE General AROM: Knee AROM: 4-76, increased pain  AROM RUE (degrees)  RUE General AROM: See OT  AROM LUE (degrees)  LUE General AROM: See OT  Strength RLE  Strength RLE: WFL  Comment: Grossly 3+ to 4-/5  Strength LLE  Strength LLE: WFL  Comment: Grossly 3+ to 4-/5  Strength RUE  Comment: See OT  Strength LUE  Comment: See OT     Sensation  Overall Sensation Status: WNL(pt denies)  Bed mobility  Rolling to Right: Stand by assistance  Supine to Sit: Stand by assistance  Sit to Supine: Unable to assess  Scooting: Stand by assistance  Comment: Head of bed slightly elevated, minimal cues needed. Increased time to sit edge of bed. Increased pain reported with B knee movements. Pt up in recliner chair B ice packs on knees (cryocuff education given), B LE elevated. Transfers  Sit to Stand: Minimal Assistance  Stand to sit: Minimal Assistance  Comment: Min x1 transfering from bed, toilet and chair using RW with increased time to stand due to increased pain. Mild lighthheadedness reported. Ambulation  Ambulation?: Yes  WB Status: Full WBAT B LE  Ambulation 1  Surface: level tile  Device: Rolling Walker  Assistance: Contact guard assistance  Quality of Gait: B knees flexed, slow cassie, forward flexed trunk, small steps  Gait Deviations: Decreased step length; Slow Cassie  Distance: 20', 15'x2  Comments: Mild lightheadedness reported  - BPs monitored throughout. Stairs/Curb  Stairs?: No     Balance  Posture: Good  Sitting - Static: Good  Sitting - Dynamic: Good;-  Standing - Static: (MARK)  Standing - Dynamic: (MARK)  Comments: Pt is a fall risk, seated balance at edge of bed ~8 minutes, SBA. No standing balance due to pt not feeling well/lightheaded.          Plan   Plan  Times per week: BID  Specific instructions for Next Treatment: monitor BP prn, progress transfers/gait, stairs with family training, HEP  Current Treatment Recommendations: Strengthening, ROM, Balance Training, Functional Mobility Training, Transfer Training, Endurance Training, Gait Training, Stair training, Equipment Evaluation, Education, & procurement, Patient/Caregiver Education & Training, Safety Education & Training, Home Exercise Program, Positioning, Pain Management  Safety Devices  Type of devices: All fall risk precautions in place, Call light within reach, Gait belt, Patient at risk for falls, Left in bed, Nurse notified(REGGIE Gambino)  Restraints  Initially in place: No    AM-PAC Score  AM-PAC Inpatient Mobility Raw Score : 10 (09/23/20 0850)  AM-PAC Inpatient T-Scale Score : 32.29 (09/23/20 0850)  Mobility Inpatient CMS 0-100% Score: 76.75 (09/23/20 0850)  Mobility Inpatient CMS G-Code Modifier : CL (09/23/20 0850)          Goals  Short term goals  Time Frame for Short term goals: 2-3 treatments  Short term goal 1: Pt to demonstrate Mod I bed mobility. Short term goal 2: Pt to demonstrate SBA transfers. Short term goal 3: Pt to amb 50'-100' SBA/CGA. Short term goal 4: Pt to ascend/descend 3 stairs with family training, CGA. Short term goal 5: Pt to demonstrate good technique for HEP. Short term goal 6: Pt to improve knee AROM LLE: 0-85 and R LE 0-90 to improve ease of mobility. Short term goal 7: Pt to reduce pain to 2-3/10 to improve independence for safe mobility. Patient Goals   Patient goals :  To go home       Therapy Time   Individual Concurrent Group Co-treatment   Time In 850         Time Out 917         Minutes 27         Timed Code Treatment Minutes: Godfrey Dudley, PT

## 2020-09-23 NOTE — PROGRESS NOTES
recliner chair at end of session with BLEs elevated and cryo cuff on  Balance  Sitting Balance: Independent(seated edge of bed / on toilet)  Standing Balance: Minimal assistance(Min to CGA)  Standing Balance  Time: 1-2 min  Activity: standing at toilet for underwear management/transfers  Functional Mobility  Functional - Mobility Device: Rolling Walker  Activity: To/from bathroom  Assist Level: Contact guard assistance  Functional Mobility Comments: small steps, mild light headedness - BP monitored throughout   ADL  Feeding: Independent  Grooming: Contact guard assistance(standing)  UE Bathing: Stand by assistance  LE Bathing: Maximum assistance  UE Dressing: Stand by assistance  LE Dressing: Maximum assistance(total assist to don TEDs and footies)  Toileting: Minimal assistance(able to perform BM hygiene seated, pulling up underwear with CGA-Min for balance)  Additional Comments: Provided education on TEDs (wear schedule/donning and doffing with EZ slide), education on cryo pack system, patient reports he will have family assist with socks/shoes and pants as needed. Agreeable to attempt lower body dressing tomorrow before going home. Transfers  Sit to stand: Minimal assistance  Stand to sit: Minimal assistance  Transfer Comments: verbal cues for hand placement  Toilet Transfers  Toilet - Technique: Ambulating(with rolling walker)  Equipment Used: Grab bars  Toilet Transfer: Minimal assistance  Toilet Transfers Comments: attempted to simulate home set up - unable to push up only from one side (has counter/sink at home) education for using bedside commode (patient has at home) over toilet for increased ease, patient has a high toilet at home  [address] Transfers Comments: discussed use of shower chair/bench for home showers, reports he has both available.  provided with long handled sponge                             Assessment  Performance deficits / Impairments: Decreased functional mobility

## 2020-09-23 NOTE — PROGRESS NOTES
[facility]: SUELLEN BARROW   Occupational Therapy Evaluation  Date: 20  Patient Name: Nena Love       Room: 2068/4532-60  MRN: [id number]  Account: [de-identified]   : 1965  (47 y.o.) Gender: male     Discharge Recommendations: The patient would benefit from additional Occupational Therapy after discharge from the facility upon return to their Home. OT Equipment Recommendations  Equipment Needed: (TBD)    Referring Practitioner: Shanti Hawk MD  Diagnosis: OA of knees toni  Additional Pertinent Hx: toni TKA by Dr Kassy Aguilera 20    Treatment Diagnosis: impaired self care status  Past Medical History:  has a past medical history of Arthritis and Sleep apnea. Past Surgical History:   has a past surgical history that includes Gastric bypass surgery (); Knee arthroscopy (Left, ); [place] tooth extraction; and REPLACEMENT TOTAL KNEE BILATERAL (Bilateral, 2020). Restrictions  Restrictions/Precautions: General Precautions, Surgical Protocols, Fall Risk, Weight Bearing  Implants present? : Metal implants(B TKA)  Other position/activity restrictions: Activity orders per Dr Kassy Aguilera  Right Lower Extremity Weight Bearing: Weight Bearing As Tolerated(Full WBAT)  Left Lower Extremity Weight Bearing: Weight Bearing As Tolerated(Full WBAT)  Required Braces or Orthoses?: No     Vitals    O2 Device: None (Room air)  Blood Pressure Lyin/62  Pulse Lyin PER MINUTE  Blood Pressure Sittin/55  Pulse Sittin PER MINUTE  Blood Pressure Standing: (MARK)  Pulse Standing: (MARK)      Subjective  Subjective: \"ok I need to lay down\"  Comments: REGGIE Gallegos ok'd OT/PT Evaluation this date. Reports patient has been up to bathroom with nursing however experienced low BP and was put back to bed. Patient pleasant and agreeable to AM Evaluation, BPs recorded below in vitals section (dropped to 93/55 in sitting ) patient c/o lightheadedness and requesing to lay down.  Unable to assess standing/mobility during eval.  Overall Orientation Status: Within Normal Limits  Orientation Level: Oriented X4  Vision  Vision: Impaired  Vision Exceptions: Wears glasses for distance  Hearing  Hearing: Within functional limits  Social/Functional History  Lives With: Family(wife, 2 sons, 2 daughters, mother)  Type of Home: House  Home Layout: Two level, Able to Live on Main level with bedroom/bathroom  Home Access: Stairs to enter with rails  Entrance Stairs - Number of Steps: 3  Entrance Stairs - Rails: Both(can reach both)  Bathroom Shower/Tub: Tub/Shower unit, Curtain, Shower chair with back  [employer]: Handicap height  Bathroom Equipment: Shower chair, Hand-held shower(sink next to toilet)  Bathroom Accessibility: Accessible, Walker accessible  Home Equipment: (no DME)  ADL Assistance: Independent  Homemaking Assistance: (wife does)  Homemaking Responsibilities: No  Ambulation Assistance: Independent  Transfer Assistance: Independent  Active : Yes  Mode of Transportation: Car, Truck  Occupation: Full time employment  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 Wife will be available first few days, kids will also be there.    Pain Assessment  Pain Assessment: 0-10  Pain Level: 3  Pain Type: Acute pain, Surgical pain  Pain Location: Knee  Pain Orientation: Left  Pain Descriptors: Discomfort, Sore  Pain Frequency: Continuous  Clinical Progression: Gradually worsening  Non-Pharmaceutical Pain Intervention(s): Cold applied, Ambulation/Increased Activity, Rest, Repositioned    Objective      Cognition  Overall Cognitive Status: WNL   Sensation  Overall Sensation Status: WNL(pt denies)   ADL  Feeding: Setup  Grooming: Setup  UE Bathing: Minimal assistance  LE Bathing: Maximum assistance  UE Dressing: Minimal assistance  LE Dressing: Maximum assistance(total assist to don footies)  Toileting: Minimal assistance(nursing reports patient up to bathroom this AM)  Additional Comments: AM: patient required assistance donning footies, other assist levels based on clinical observation/reasoning. UE Function           LUE Strength  Gross LUE Strength: WFL     LUE Tone: Normotonic     LUE AROM (degrees)  LUE AROM : WNL     Left Hand AROM (degrees)  Left Hand AROM: WNL  RUE Strength  Gross RUE Strength: WFL      RUE Tone: Normotonic     RUE AROM (degrees)  RUE AROM : WNL     Right Hand AROM (degrees)  Right Hand AROM: WNL    Fine Motor Skills  Coordination  Movements Are Fluid And Coordinated: Yes                           Mobility  Supine to Sit: Stand by assistance  Sit to Supine: Minimal assistance(at BLE)       Balance  Sitting Balance: Stand by assistance(c/o lightheadedness, BP 93/55- pt requesting to lay down)  Standing Balance: Unable to assess(comment)     Functional Mobility  Functional Mobility Comments: AM- unable to assess due to low BP  Bed mobility  Rolling to Right: Stand by assistance  Supine to Sit: Stand by assistance  Sit to Supine: Minimal assistance(at BLE)  Scooting: Stand by assistance  Comment: Flat bed, increased time to progress to edge of bed. Reports lightheaded/dizzzy. BP/o2 monitored. Returned to bed at end of AM session due to low BP and lightheadedness     Transfers  Sit to stand: Unable to assess  Stand to sit: Unable to assess  Transfer Comments: deferred - low BP AM      Assessment  Performance deficits / Impairments: Decreased functional mobility , Decreased ADL status, Decreased balance, Decreased endurance  Assessment: Patient heavily limited this evaluation by low BP and lightheadedness.   Treatment Diagnosis: impaired self care status  Prognosis: Good  Decision Making: Medium Complexity  REQUIRES OT FOLLOW UP: Yes  Activity Tolerance: Patient limited by pain, Treatment limited secondary to medical complications (free text)  Activity Tolerance: low BPs/lightheadedness         Functional Outcome Measures  AM-PAC Daily Activity Inpatient   How much help for putting on and taking off regular lower body clothing?: A Lot  How much help for Bathing?: A Lot  How much help for Toileting?: A Lot  How much help for putting on and taking off regular upper body clothing?: A Little  How much help for taking care of personal grooming?: A Little  How much help for eating meals?: None  AM-[facility] Inpatient Daily Activity Raw Score: 16  AM-PAC Inpatient ADL T-Scale Score : 35.96  ADL Inpatient CMS 0-100% Score: 53.32  ADL Inpatient CMS G-Code Modifier : CK       Goals  Short term goals  Time Frame for Short term goals: by discharge, patient will  Short term goal 1: perform functional transfers/mobility during ADL routine with SBA using rolling walker with Good safety  Short term goal 2: perform toileting routine with SBA with Good safety  Short term goal 3: verbalize/demonstrate Good understanding of adaptive equipment/compensatory techniques for increased ease and independence with self care/mobility tasks  Short term goal 4: verbalize/demonstrate Good understanding of durable medical equipment for increased bathroom safety for home discharge    Plan  Safety Devices  Safety Devices in place: Yes  Type of devices: Nurse notified, Call light within reach, Left in bed(REGGIE Murcia notified)     Plan  Times per week: 1-2x/day, 5-7x/wk  Current Treatment Recommendations: Endurance Training, Patient/Caregiver Education & Training, Equipment Evaluation, Education, & procurement, Self-Care / ADL, Balance Training, Safety Education & Training, Functional Mobility Training, Positioning, Pain Management  OT Education  OT Education: OT Role, Plan of Care, Orientation  Patient Education: safety, activity promotion, bed mobility    OT Equipment Recommendations  Equipment Needed: (TBD)  OT Individual Minutes  Time In: 2820  Time Out: 0915  Minutes: 25    Electronically signed by Eda Khan OT on 9/23/20 at 11:30 AM EDT

## 2020-09-24 LAB
ABSOLUTE EOS #: 0 K/UL (ref 0–0.4)
ABSOLUTE IMMATURE GRANULOCYTE: ABNORMAL K/UL (ref 0–0.3)
ABSOLUTE LYMPH #: 1 K/UL (ref 1–4.8)
ABSOLUTE MONO #: 1.1 K/UL (ref 0.1–1.3)
ANION GAP SERPL CALCULATED.3IONS-SCNC: 8 MMOL/L (ref 9–17)
BASOPHILS # BLD: 0 % (ref 0–2)
BASOPHILS ABSOLUTE: 0 K/UL (ref 0–0.2)
BUN BLDV-MCNC: 13 MG/DL (ref 6–20)
BUN/CREAT BLD: ABNORMAL (ref 9–20)
CALCIUM SERPL-MCNC: 8.3 MG/DL (ref 8.6–10.4)
CHLORIDE BLD-SCNC: 101 MMOL/L (ref 98–107)
CO2: 26 MMOL/L (ref 20–31)
CREAT SERPL-MCNC: 0.82 MG/DL (ref 0.7–1.2)
DIFFERENTIAL TYPE: ABNORMAL
EOSINOPHILS RELATIVE PERCENT: 0 % (ref 0–4)
GFR AFRICAN AMERICAN: >60 ML/MIN
GFR NON-AFRICAN AMERICAN: >60 ML/MIN
GFR SERPL CREATININE-BSD FRML MDRD: ABNORMAL ML/MIN/{1.73_M2}
GFR SERPL CREATININE-BSD FRML MDRD: ABNORMAL ML/MIN/{1.73_M2}
GLUCOSE BLD-MCNC: 126 MG/DL (ref 70–99)
HCT VFR BLD CALC: 30.6 % (ref 41–53)
HEMOGLOBIN: 10.5 G/DL (ref 13.5–17.5)
IMMATURE GRANULOCYTES: ABNORMAL %
LYMPHOCYTES # BLD: 11 % (ref 24–44)
MCH RBC QN AUTO: 30.2 PG (ref 26–34)
MCHC RBC AUTO-ENTMCNC: 34.4 G/DL (ref 31–37)
MCV RBC AUTO: 87.9 FL (ref 80–100)
MONOCYTES # BLD: 12 % (ref 1–7)
NRBC AUTOMATED: ABNORMAL PER 100 WBC
PDW BLD-RTO: 13.9 % (ref 11.5–14.9)
PLATELET # BLD: 154 K/UL (ref 150–450)
PLATELET ESTIMATE: ABNORMAL
PMV BLD AUTO: 8.5 FL (ref 6–12)
POTASSIUM SERPL-SCNC: 4.3 MMOL/L (ref 3.7–5.3)
RBC # BLD: 3.48 M/UL (ref 4.5–5.9)
RBC # BLD: ABNORMAL 10*6/UL
SEG NEUTROPHILS: 77 % (ref 36–66)
SEGMENTED NEUTROPHILS ABSOLUTE COUNT: 7 K/UL (ref 1.3–9.1)
SODIUM BLD-SCNC: 135 MMOL/L (ref 135–144)
WBC # BLD: 9.1 K/UL (ref 3.5–11)
WBC # BLD: ABNORMAL 10*3/UL

## 2020-09-24 PROCEDURE — 2580000003 HC RX 258: Performed by: ORTHOPAEDIC SURGERY

## 2020-09-24 PROCEDURE — 97110 THERAPEUTIC EXERCISES: CPT

## 2020-09-24 PROCEDURE — 97116 GAIT TRAINING THERAPY: CPT

## 2020-09-24 PROCEDURE — 97530 THERAPEUTIC ACTIVITIES: CPT

## 2020-09-24 PROCEDURE — 2580000003 HC RX 258: Performed by: INTERNAL MEDICINE

## 2020-09-24 PROCEDURE — 6370000000 HC RX 637 (ALT 250 FOR IP): Performed by: INTERNAL MEDICINE

## 2020-09-24 PROCEDURE — 85025 COMPLETE CBC W/AUTO DIFF WBC: CPT

## 2020-09-24 PROCEDURE — G0378 HOSPITAL OBSERVATION PER HR: HCPCS

## 2020-09-24 PROCEDURE — 36415 COLL VENOUS BLD VENIPUNCTURE: CPT

## 2020-09-24 PROCEDURE — 80048 BASIC METABOLIC PNL TOTAL CA: CPT

## 2020-09-24 PROCEDURE — 99220 PR INITIAL OBSERVATION CARE/DAY 70 MINUTES: CPT | Performed by: INTERNAL MEDICINE

## 2020-09-24 PROCEDURE — 6370000000 HC RX 637 (ALT 250 FOR IP): Performed by: ORTHOPAEDIC SURGERY

## 2020-09-24 RX ORDER — FENTANYL 25 UG/H
1 PATCH TRANSDERMAL
Status: DISCONTINUED | OUTPATIENT
Start: 2020-09-24 | End: 2020-09-25 | Stop reason: HOSPADM

## 2020-09-24 RX ORDER — 0.9 % SODIUM CHLORIDE 0.9 %
1000 INTRAVENOUS SOLUTION INTRAVENOUS ONCE
Status: COMPLETED | OUTPATIENT
Start: 2020-09-24 | End: 2020-09-24

## 2020-09-24 RX ADMIN — ASPIRIN 325 MG: 325 TABLET, COATED ORAL at 21:40

## 2020-09-24 RX ADMIN — Medication 10 ML: at 08:14

## 2020-09-24 RX ADMIN — CYCLOBENZAPRINE 10 MG: 10 TABLET, FILM COATED ORAL at 12:05

## 2020-09-24 RX ADMIN — OXYCODONE HYDROCHLORIDE 10 MG: 10 TABLET ORAL at 08:13

## 2020-09-24 RX ADMIN — CYCLOBENZAPRINE 10 MG: 10 TABLET, FILM COATED ORAL at 04:38

## 2020-09-24 RX ADMIN — ASPIRIN 325 MG: 325 TABLET, COATED ORAL at 08:12

## 2020-09-24 RX ADMIN — ACETAMINOPHEN 650 MG: 325 TABLET, FILM COATED ORAL at 16:57

## 2020-09-24 RX ADMIN — CYCLOBENZAPRINE 10 MG: 10 TABLET, FILM COATED ORAL at 23:06

## 2020-09-24 RX ADMIN — SENNOSIDES AND DOCUSATE SODIUM 1 TABLET: 8.6; 5 TABLET ORAL at 21:40

## 2020-09-24 RX ADMIN — OXYCODONE HYDROCHLORIDE 10 MG: 10 TABLET ORAL at 02:20

## 2020-09-24 RX ADMIN — ACETAMINOPHEN 650 MG: 325 TABLET, FILM COATED ORAL at 21:40

## 2020-09-24 RX ADMIN — SODIUM CHLORIDE 1000 ML: 9 INJECTION, SOLUTION INTRAVENOUS at 16:50

## 2020-09-24 RX ADMIN — ACETAMINOPHEN 650 MG: 325 TABLET, FILM COATED ORAL at 10:54

## 2020-09-24 RX ADMIN — OXYCODONE HYDROCHLORIDE 10 MG: 10 TABLET ORAL at 21:40

## 2020-09-24 RX ADMIN — ACETAMINOPHEN 650 MG: 325 TABLET, FILM COATED ORAL at 04:38

## 2020-09-24 ASSESSMENT — PAIN SCALES - GENERAL
PAINLEVEL_OUTOF10: 6
PAINLEVEL_OUTOF10: 8
PAINLEVEL_OUTOF10: 7
PAINLEVEL_OUTOF10: 4
PAINLEVEL_OUTOF10: 4
PAINLEVEL_OUTOF10: 6
PAINLEVEL_OUTOF10: 7
PAINLEVEL_OUTOF10: 10
PAINLEVEL_OUTOF10: 6
PAINLEVEL_OUTOF10: 6

## 2020-09-24 ASSESSMENT — PAIN DESCRIPTION - DESCRIPTORS: DESCRIPTORS: ACHING;SORE

## 2020-09-24 ASSESSMENT — PAIN DESCRIPTION - PAIN TYPE
TYPE: ACUTE PAIN;SURGICAL PAIN
TYPE: SURGICAL PAIN;ACUTE PAIN

## 2020-09-24 ASSESSMENT — PAIN DESCRIPTION - ORIENTATION
ORIENTATION: RIGHT;LEFT
ORIENTATION: RIGHT;LEFT

## 2020-09-24 ASSESSMENT — PAIN DESCRIPTION - LOCATION
LOCATION: KNEE
LOCATION: KNEE

## 2020-09-24 NOTE — PROGRESS NOTES
Writer has tried multiple times to get patient up and out of bed this shift. This past time writer tried, patient automatically stated, \"no! I will get up later. \" Writer reminded patient about importance of moving after surgery. Patient verbalizes understanding.

## 2020-09-24 NOTE — PROGRESS NOTES
New orders from Dr. Beulah Spurling. [facility] for medical management. Will cont to monitor.

## 2020-09-24 NOTE — PROGRESS NOTES
Patient still tachycardic and a MEWS of 3. Dr. Shaji Hope called and new order for NaCl 1 liter bolus x 2 hours.

## 2020-09-24 NOTE — PROGRESS NOTES
[address] [facility]    INPATIENT OCCUPATIONAL THERAPY  PROGRESS NOTE  Date: 2020  Patient Name: Tom Fortune      Room: 0908/6499-09  MRN: [id number]    : 1965  (47 y.o.) Gender: male     Discharge Recommendations:  Further Occupational Therapy is recommended upon facility discharge. OT Equipment Recommendations  Equipment Needed: (reacher)    Referring Practitioner: Eddie Resendiz MD  Diagnosis: OA of knees toni  General  Chart Reviewed: Progress Notes  Patient assessed for rehabilitation services?: Yes  Additional Pertinent Hx: toni TKA by Dr Lillie Andrews 20  Family / Caregiver Present: No  Referring Practitioner: Eddie Resendiz MD  Diagnosis: OA of knees toni    Restrictions  Restrictions/Precautions: General Precautions, Surgical Protocols, Fall Risk, Weight Bearing  Implants present? : Metal implants(B TKA)  Other position/activity restrictions: Activity orders per Dr Lillie Andrwes  Right Lower Extremity Weight Bearing: Weight Bearing As Tolerated(Full WBAT)  Left Lower Extremity Weight Bearing: Weight Bearing As Tolerated(Full WBAT)  Required Braces or Orthoses?: No      Subjective  Subjective: \"I haven't been able to sleep at all since I've been here. \"  Comments: pt drowsy this PM and demo difficulty staying awake during tx. BODØ RN notified and states that pt recently has fentnyl patch placed.   Patient Currently in Pain: Yes  Pain Level: 7  Pain Location: Knee  Pain Orientation: Right;Left  Overall Orientation Status: Within Normal Limits  Orientation Level: Oriented X4  Patient Observation  Observations: bilateral knee bandages,   Pain Assessment  Pain Assessment: 0-10  Pain Level: 7  Pain Type: Acute pain, Surgical pain  Pain Location: Knee  Pain Orientation: Right, Left  Pain Descriptors: Aching, Sore  Response to Pain Intervention: None    Objective        Balance  Sitting Balance: Unable to assess(comment)(pt demo difficulty staying awake)         ADL  Feeding: Independent  Grooming: Contact guard assistance  UE Bathing: Stand by assistance  LE Bathing: Maximum assistance  UE Dressing: Stand by assistance  LE Dressing: Maximum assistance  Toileting: Minimal assistance  Additional Comments: pt states that he will have family assist with socks/shoes and pants as needed. answers are based on clinical reasoning due to pts drowsyness this date. Shower Transfers  Shower Transfers Comments: discussed use of shower chair/bench for home showers, reports he has both available           Assessment  Performance deficits / Impairments: Decreased functional mobility ; Decreased ADL status; Decreased balance;Decreased endurance  Assessment: Patient heavily limited this evaluation by low BP and lightheadedness. Prognosis: Good  Activity Tolerance: (pt unable to keep eyes open)  Safety Devices in place: Yes  Type of devices: Nurse notified;Call light within reach; Left in bed             Patient Education:  Patient Education: OT POC, activity promotion, home safety fall prevention, RW safety and tech during transfers  Learner:patient  Method: demonstration, explanation and handout       Outcome: acknowledged understanding and needs reinforcement     Plan  Safety Devices  Safety Devices in place: Yes  Type of devices: Nurse notified, Call light within reach, Left in bed  Plan  Times per week: 1-2x/day, 5-7x/wk  Current Treatment Recommendations: Endurance Training, Patient/Caregiver Education & Training, Equipment Evaluation, Education, & procurement, Self-Care / ADL, Balance Training, Safety Education & Training, Functional Mobility Training, Positioning, Pain Management      Goals  Short term goals  Time Frame for Short term goals: by discharge, patient will  Short term goal 1: perform functional transfers/mobility during ADL routine with SBA using rolling walker with Good safety  Short term goal 2: perform toileting routine with SBA with Good safety  Short term goal 3: verbalize/demonstrate Good understanding of adaptive equipment/compensatory techniques for increased ease and independence with self care/mobility tasks  Short term goal 4: verbalize/demonstrate Good understanding of durable medical equipment for increased bathroom safety for home discharge    OT Individual Minutes  Time In: 1444  Time Out: 1502  Minutes: 18      Electronically signed by ROSELYN Menendez on 9/24/20 at 3:21 PM EDT

## 2020-09-24 NOTE — CONSULTS
[facility]    CONSULTATION / HISTORY AND PHYSICAL EXAMINATION            Date:   9/24/2020  Patient name:  Iggy Joshi  Date of admission:  9/22/2020 11:30 AM  MRN:   [id number]  Account:  [de-identified]  YOB: 1965  PCP:    Kiel Rios MD  Room:   2037/2037-01  Code Status:    Full Code    Physician Requesting Consult: Trevor Gastelum MD    Reason for Consult: Med management tachycardia    Chief Complaint:     No chief complaint on file. Tachycardia and pain issues    History Obtained From:     Patient medical record nursing staff    History of Present Illness:     42-year-old morbidly obese gentleman BMI 41.20 previously healthy history of chronic osteoarthritis bilateral knees has been hockey and football player lifelong  History of gastric bypass 3 years ago loss and and 50 pounds  Postop bilateral knee replacement significant pain bilateral knees 10 out of 10 with movement heart rate goes up into 120s sinus tach  Denies any chest pain no history of DVT or PE no dizziness no exertional dyspnea    Past Medical History:     Past Medical History:   Diagnosis Date    Arthritis     Sleep apnea     does not use machine-has improved since gastric bypass        Past Surgical History:     Past Surgical History:   Procedure Laterality Date    GASTRIC BYPASS SURGERY  2017    KNEE ARTHROSCOPY Left 2005    ACL repair    REPLACEMENT TOTAL KNEE BILATERAL Bilateral 9/22/2020    KNEE TOTAL ARTHROPLASTY BILATERAL performed by Trevor Gastelum MD at [place] [address]          Medications Prior to Admission:     Prior to Admission medications    Medication Sig Start Date End Date Taking? Authorizing Provider   oxyCODONE HCl (OXY-IR) 10 MG immediate release tablet Take 1 tablet by mouth every 4 hours as needed for Pain for up to 7 days.  9/23/20 9/30/20 Yes Trevor Gastelum MD   Aspirin Buf,UyZkq-BsTbm-LfUeg, (BUFFERED ASPIRIN) 325 MG TABS Take 1 tablet by mouth 2 times daily 9/23/20  Yes Breanna Mary MD   calcium citrate-vitamin D (CALCIUM CITRATE + D) 315-200 MG-UNIT per tablet Take 2 tablets by mouth 3 times daily Takes 2 early Am   2 morning   1 evening   Yes Historical Provider, MD   Prenatal Multivit-Min-Fe-FA (PRENATAL 1 + IRON PO) Take 1 tablet by mouth daily   Yes Historical Provider, MD   acetaminophen (TYLENOL) 500 MG tablet Take 500 mg by mouth every 6 hours as needed for Pain   Yes Historical Provider, MD        Allergies:     Patient has no known allergies. Social History:     Tobacco:    reports that he quit smoking about 15 years ago. He has never used smokeless tobacco.  Alcohol:      reports previous alcohol use. Drug Use:  reports previous drug use. Family History:     History reviewed. No pertinent family history. Review of Systems:     Positive and Negative as described in HPI. CONSTITUTIONAL:  negative for fevers, chills, sweats, fatigue, weight loss  HEENT:  negative for vision, hearing changes, runny nose, throat pain  RESPIRATORY:  negative for shortness of breath, cough, congestion, wheezing. CARDIOVASCULAR:  negative for chest pain, palpitations.   GASTROINTESTINAL:  negative for nausea, vomiting, diarrhea, constipation, change in bowel habits, abdominal pain   GENITOURINARY:  negative for difficulty of urination, burning with urination, frequency   INTEGUMENT:  negative for rash, skin lesions, easy bruising   HEMATOLOGIC/LYMPHATIC:  negative for swelling/edema   ALLERGIC/IMMUNOLOGIC:  negative for urticaria , itching  ENDOCRINE:  negative increase in drinking, increase in urination, hot or cold intolerance  MUSCULOSKELETAL: BILATERAL KNEE REPLACEMENT PAIN 10 OUT OF 10 NEUROLOGICAL:  negative for headaches, dizziness, lightheadedness, numbness, pain, tingling extremities  BEHAVIOR/PSYCH:  negative for depression, anxiety    Physical Exam:     BP (!) 141/86   Pulse 110   Temp 99.2 °F (37.3 °C) (Oral) Resp 16   Ht 5' 11\" (1.803 m)   Wt 295 lb 6.7 oz (134 kg)   SpO2 95%   BMI 41.20 kg/m²   Temp (24hrs), Av.6 °F (37 °C), Min:97.7 °F (36.5 °C), Max:99.5 °F (37.5 °C)    No results for input(s): POCGLU in the last 72 hours. Intake/Output Summary (Last 24 hours) at 2020 1154  Last data filed at 2020 0815  Gross per 24 hour   Intake --   Output 325 ml   Net -325 ml     Morbid obese  General Appearance:  alert, well appearing, and in no acute distress  Mental status: oriented to person, place, and time with normal affect  Head:  normocephalic, atraumatic. Eye: no icterus, redness, pupils equal and reactive, extraocular eye movements intact, conjunctiva clear  Ear: normal external ear, no discharge, hearing intact  Nose:  no drainage noted  Mouth: mucous membranes moist  Neck: supple, no carotid bruits, thyroid not palpable  Lungs: Bilateral equal air entry, clear to ausculation, no wheezing, rales or rhonchi, normal effort  Cardiovascular: normal rate, regular rhythm, no murmur, gallop, rub. Abdomen: Soft, nontender, nondistended, normal bowel sounds, no hepatomegaly or splenomegaly  Neurologic: There are no new focal motor or sensory deficits, normal muscle tone and bulk, no abnormal sensation, normal speech, cranial nerves II through XII grossly intact  Skin: No gross lesions, rashes, bruising or bleeding on exposed skin area  Extremities: Bilateral posterior knee replacement edema legs noted psych: normal affect    Investigations:      Laboratory Testing:  No results found for this or any previous visit (from the past 24 hour(s)).     Imaging/Diagonstics:      Assessment :      Primary Problem  <principal problem not specified>    Active Hospital Problems    Diagnosis Date Noted    Primary osteoarthritis of knees, bilateral [M17.0] 2020       Plan:     Sinus tachycardia due to pain  Will add fentanyl patch 25 mcg  Continue OxyContin short-acting every 4 hours  DVT prophylaxis on aspirin 325 twice a day  History of gastric bypass 3 years ago   Physical therapy  Okay to discharge when pain control is better and exertional heart rate is less than 110  Consultations:   IP CONSULT TO SOCIAL WORK  IP CONSULT TO INTERNAL MEDICINE      Morena Vera MD  9/24/2020  11:54 AM    Copy sent to Dr. Rachna Gomez MD    Please note that this chart was generated using voice recognition Dragon dictation software. Although every effort was made to ensure the accuracy of this automated transcription, some errors in transcription may have occurred.

## 2020-09-24 NOTE — FLOWSHEET NOTE
09/24/20 1624   Encounter Summary   Services provided to: Patient   Referral/Consult From: Rounding   Continue Visiting   (9-24-20)   Complexity of Encounter Low   Length of Encounter 15 minutes   Routine   Type Initial   Assessment Calm; Approachable   Intervention Active listening;Explored feelings, thoughts, concerns;Sustaining presence/ Ministry of presence; Discussed illness/injury and it's impact   Outcome Expressed gratitude;Engaged in conversation;Coping

## 2020-09-24 NOTE — PROGRESS NOTES
Physical Therapy  [address] [facility]    Physical Therapy Progress Note    Date: 20  Patient Name: Karen Sung       Room: 9682/0971-27  MRN: [id number]   Account: [de-identified]   : 1965  (47 y.o.) Gender: male     Referring Practitioner: Tay Katz MD  Diagnosis: OA of knees toni  Past Medical History:  has a past medical history of Arthritis and Sleep apnea. Past Surgical History:   has a past surgical history that includes Gastric bypass surgery (); Knee arthroscopy (Left, ); [place] tooth extraction; and REPLACEMENT TOTAL KNEE BILATERAL (Bilateral, 2020). Additional Pertinent Hx: arthritis, gastric bypass       Restrictions/Precautions  Restrictions/Precautions: General Precautions;Surgical Protocols; Fall Risk;Weight Bearing  Required Braces or Orthoses?: No  Implants present? : Metal implants(B TKA)  Lower Extremity Weight Bearing Restrictions  Right Lower Extremity Weight Bearing: Weight Bearing As Tolerated(Full WBAT)  Left Lower Extremity Weight Bearing: Weight Bearing As Tolerated(Full WBAT)  Position Activity Restriction  Other position/activity restrictions: Activity orders per Dr Chrissy Cardoza            Vital Signs  Pulse: 123  BP: 129/77  BP Location: Left upper arm  BP Upper/Lower: Upper  Patient Position: Sitting  Patient Currently in Pain: Yes  Pain Assessment: 0-10  Pain Level: 8  Pain Type: Surgical pain;Acute pain  Pain Location: Knee  Pain Orientation: Right;Left                Bed Mobility:   Bed Mobility  Supine to Sit: Minimal assistance(HOB elevated)  Sit to Supine: Minimal assistance(assist with LE's only, bed flat)  Scooting: Stand by assistance;Minimal assistance(to EOB sitting, Min a scooting hips to center of bed)  Comment: Elevated HR sitting   to 123 after 5min of sitting.   Bed mobility  Scooting: Stand by assistance;Minimal assistance(to EOB sitting, Min a scooting hips to center of bed)    Transfers:  Sit to Stand: Contact guard assistance;2 Person Assistance  Stand to sit: Minimal Assistance(cues to reach back)              WB Status: Full WBAT B LE  Ambulation 1  Surface: level tile  Device: Rolling Walker  Assistance: Contact guard assistance  Quality of Gait: B knees flexed, antalgic slow cassie, forward flexed trunk, small steps  Gait Deviations: Decreased step length;Decreased step height;Slow Cassie  Distance: 15ft  Comments: HR elevated to 157, RN notified  Ambulation 2  Surface - 2: level tile  Device 2: Rolling Walker  Quality of Gait 2: same as above, minimal heel strike with initial contact, fwd flexed posture  Gait Deviations: Decreased step length;Decreased step height;Slow Cassie  Distance: 12ft  Comments: distance limited by pain.  post amb     Stairs/Curb  Stairs?: Yes  Stairs  # Steps : 3  Stairs Height: 4\"  Rails: Bilateral  Assistance: Contact guard assistance  Comment: Pt educated in correct step to sequencing to ascend/decend steps. Pt ascended steps with circumduction. Decended steps retro due to limited mobility and pain. BALANCE Posture: Good  Sitting - Static: Good  Sitting - Dynamic: Good;-  Standing - Static: Good;-(w/RW)  Standing - Dynamic: Fair;+(w/RW)    EXERCISES    Other exercises?: Yes  Other exercises 1: Supine (B) TKA ex x 10(AAROM prn)  Other exercises 2: (B) LE seated ex 10-20x; AAROM pt using towel to self assist  Other exercises 3: towel slides x 10ea           Activity Tolerance: Patient limited by pain, Patient Tolerated treatment well  PT Equipment Recommendations  Equipment Needed: Yes  Mobility Devices: Alberta Dung; ADL AssistiveDevices  Walker: Rolling  ADL Assistive Devices: Grab Bars - shower;Grab Bars - toilet  Other Comments  Comments: Left pt sitting up in recliner with (B) cryo cuffs applied.  Cryo cuffs applied following pm session as well    Patient Education  New Education Provided:  gait training, transfer training, stair training, general strengthening/ROM EDT

## 2020-09-24 NOTE — CARE COORDINATION
ONGOING DISCHARGE PLAN:    Spoke with patient regarding discharge plan and patient confirms that plan is still home with [facility]. POD#2 bilateral total knee arthroplasty. Will continue to follow for additional discharge needs.     Electronically signed by Wisnome Rivera RN on 9/24/2020 at 9:02 AM

## 2020-09-25 VITALS
DIASTOLIC BLOOD PRESSURE: 75 MMHG | HEIGHT: 71 IN | HEART RATE: 98 BPM | RESPIRATION RATE: 18 BRPM | SYSTOLIC BLOOD PRESSURE: 132 MMHG | BODY MASS INDEX: 42.62 KG/M2 | WEIGHT: 304.45 LBS | TEMPERATURE: 98.3 F | OXYGEN SATURATION: 98 %

## 2020-09-25 PROCEDURE — 97110 THERAPEUTIC EXERCISES: CPT

## 2020-09-25 PROCEDURE — 97530 THERAPEUTIC ACTIVITIES: CPT

## 2020-09-25 PROCEDURE — 6370000000 HC RX 637 (ALT 250 FOR IP): Performed by: ORTHOPAEDIC SURGERY

## 2020-09-25 PROCEDURE — 2580000003 HC RX 258: Performed by: INTERNAL MEDICINE

## 2020-09-25 PROCEDURE — 97535 SELF CARE MNGMENT TRAINING: CPT

## 2020-09-25 PROCEDURE — 99225 PR SBSQ OBSERVATION CARE/DAY 25 MINUTES: CPT | Performed by: INTERNAL MEDICINE

## 2020-09-25 PROCEDURE — 2580000003 HC RX 258: Performed by: ORTHOPAEDIC SURGERY

## 2020-09-25 PROCEDURE — 97116 GAIT TRAINING THERAPY: CPT

## 2020-09-25 PROCEDURE — 6370000000 HC RX 637 (ALT 250 FOR IP): Performed by: INTERNAL MEDICINE

## 2020-09-25 PROCEDURE — G0378 HOSPITAL OBSERVATION PER HR: HCPCS

## 2020-09-25 RX ORDER — 0.9 % SODIUM CHLORIDE 0.9 %
1000 INTRAVENOUS SOLUTION INTRAVENOUS ONCE
Status: COMPLETED | OUTPATIENT
Start: 2020-09-25 | End: 2020-09-25

## 2020-09-25 RX ADMIN — SENNOSIDES AND DOCUSATE SODIUM 1 TABLET: 8.6; 5 TABLET ORAL at 07:40

## 2020-09-25 RX ADMIN — ACETAMINOPHEN 650 MG: 325 TABLET, FILM COATED ORAL at 15:07

## 2020-09-25 RX ADMIN — ACETAMINOPHEN 650 MG: 325 TABLET, FILM COATED ORAL at 09:55

## 2020-09-25 RX ADMIN — OXYCODONE HYDROCHLORIDE 10 MG: 10 TABLET ORAL at 16:46

## 2020-09-25 RX ADMIN — OXYCODONE HYDROCHLORIDE 10 MG: 10 TABLET ORAL at 08:50

## 2020-09-25 RX ADMIN — OXYCODONE HYDROCHLORIDE 10 MG: 10 TABLET ORAL at 04:34

## 2020-09-25 RX ADMIN — ACETAMINOPHEN 650 MG: 325 TABLET, FILM COATED ORAL at 04:34

## 2020-09-25 RX ADMIN — SODIUM CHLORIDE 1000 ML: 9 INJECTION, SOLUTION INTRAVENOUS at 13:00

## 2020-09-25 RX ADMIN — ASPIRIN 325 MG: 325 TABLET, COATED ORAL at 07:40

## 2020-09-25 RX ADMIN — Medication 10 ML: at 07:40

## 2020-09-25 RX ADMIN — METOPROLOL TARTRATE 25 MG: 25 TABLET, FILM COATED ORAL at 11:48

## 2020-09-25 ASSESSMENT — PAIN SCALES - GENERAL
PAINLEVEL_OUTOF10: 6
PAINLEVEL_OUTOF10: 6
PAINLEVEL_OUTOF10: 7
PAINLEVEL_OUTOF10: 6
PAINLEVEL_OUTOF10: 7
PAINLEVEL_OUTOF10: 7
PAINLEVEL_OUTOF10: 3
PAINLEVEL_OUTOF10: 7

## 2020-09-25 ASSESSMENT — PAIN DESCRIPTION - LOCATION
LOCATION: KNEE
LOCATION: KNEE

## 2020-09-25 ASSESSMENT — PAIN DESCRIPTION - PAIN TYPE
TYPE: ACUTE PAIN;SURGICAL PAIN
TYPE: ACUTE PAIN;SURGICAL PAIN

## 2020-09-25 ASSESSMENT — PAIN DESCRIPTION - DESCRIPTORS: DESCRIPTORS: ACHING;SORE

## 2020-09-25 ASSESSMENT — PAIN SCALES - WONG BAKER: WONGBAKER_NUMERICALRESPONSE: 0

## 2020-09-25 ASSESSMENT — PAIN DESCRIPTION - ORIENTATION
ORIENTATION: RIGHT;LEFT
ORIENTATION: RIGHT;LEFT

## 2020-09-25 NOTE — PROGRESS NOTES
Patient up to chair with RNs encouragement. Up on his own, with RN there for support. Patient tolerated well.

## 2020-09-25 NOTE — CARE COORDINATION
ONGOING DISCHARGE PLAN:    Spoke with patient regarding discharge plan and patient confirms that plan is still home with [facility]. POD#3 bilat TKA. Spouse purchased standard walker, PT recommends front wheeled, spouse will exchange walker. Will continue to follow for additional discharge needs.     Electronically signed by Zoraida Alonzo RN on 9/25/2020 at 11:02 AM

## 2020-09-25 NOTE — PLAN OF CARE
Problem: Falls - Risk of:  Goal: Will remain free from falls  Description: Will remain free from falls  9/25/2020 1433 by Lurdes Dumont RN  Outcome: Met This Shift  Note: No falls noted this shift. Patient ambulates with x1 staff assistance without difficulty. Bed kept in low position. Safe environment maintained. Bedside table & call light in reach. Uses call light appropriately when needing assistance. 9/25/2020 0149 by Jim Cruz RN  Outcome: Ongoing  Goal: Absence of physical injury  Description: Absence of physical injury  9/25/2020 0149 by Jim Cruz RN  Outcome: Ongoing     Problem: Pain:  Goal: Pain level will decrease  Description: Pain level will decrease  9/25/2020 1433 by Lurdes Dumont RN  Outcome: Ongoing  Note: Pt medicated with pain medication prn. Assessed all pain characteristics including level, type, location, frequency, and onset. Non-pharmacologic interventions offered to pt as well. Pt states pain is tolerable at this time. Will continue to monitor. Patient getting oxi IR for pain Q4H PRN as ordered. 9/25/2020 0149 by Jim Cruz RN  Outcome: Ongoing  Goal: Control of acute pain  Description: Control of acute pain  9/25/2020 0149 by Jim Cruz RN  Outcome: Ongoing  Goal: Control of chronic pain  Description: Control of chronic pain  9/25/2020 0149 by Jim Cruz RN  Outcome: Ongoing     Problem: Musculor/Skeletal Functional Status  Goal: Highest potential functional level  9/25/2020 1433 by Lurdes Dumont RN  Outcome: Ongoing  9/25/2020 0149 by Jim Cruz RN  Outcome: Ongoing  Goal: Absence of falls  9/25/2020 0149 by Jim Cruz RN  Outcome: Ongoing     Problem: Skin Integrity:  Goal: Will show no infection signs and symptoms  Description: Will show no infection signs and symptoms  9/25/2020 1433 by Lurdes Dumont RN  Outcome: Ongoing  Note: Skin assessment performed. See head to toe assessment. Will continue to monitor. 9/25/2020 0149 by Luverne Simmonds, RN  Outcome: Ongoing  Goal: Absence of new skin breakdown  Description: Absence of new skin breakdown  9/25/2020 0149 by Luverne Simmonds, RN  Outcome: Ongoing

## 2020-09-25 NOTE — PROGRESS NOTES
mobility  Comment: pt up in chair  Balance  Sitting Balance: Supervision  Standing Balance: Stand by assistance  Standing Balance  Time: 1-2 min x 2 c 0-2 support on RW  Activity: standing for pants and underwear management/transfers  Comment: no LOB noted,        ADL  Feeding: Independent  Grooming: Contact guard assistance(declines otal/hair care this AM., WASHES face/neck)  UE Bathing: Stand by assistance;Setup  LE Bathing: Minimal assistance(assist at foot level)  UE Dressing: Stand by assistance  LE Dressing: Minimal assistance(TA for TEDs mgt, assist to tie right shoe, pt able to tie left shoe)  Toileting: Minimal assistance(able to perform BM hygiene seated, pulling up underwear with SBA for balance, per pt report)  Additional Comments: pt limited by pain and fatigue, pt completes UB/LB ADLs sitting up in chair, pt requires multiple rest breaks,states that he will have family assist with socks/shoes and pants as needed. pt edu on AE/DME to increase independence and safety. ie reacher, LH shoe horn, LH sponge, sock ais, tub transfer bench, toilet riser     Transfers  Sit to stand: Stand by assistance  Stand to sit: Stand by assistance  Transfer Comments: verbal cues for hand placement          Assessment  Performance deficits / Impairments: Decreased functional mobility ; Decreased ADL status; Decreased balance;Decreased endurance  Assessment: Patient heavily limited this evaluation by low BP and lightheadedness. Prognosis: Good  Activity Tolerance: Patient limited by fatigue;Patient Tolerated treatment well  Safety Devices in place: Yes  Type of devices: Nurse notified;Call light within reach; Left in chair             Patient Education:  Patient Education: OT POC, activity promotion, review home safety/fall prevention, RW safety and tech during transfers, AE/DME, cryo-cuff/ice application for pain/swelling mgt, car transfer  Learner:patient  Method: demonstration, explanation and handout       Outcome: acknowledged understanding and demonstrated understanding     Plan  Safety Devices  Safety Devices in place: Yes  Type of devices: Nurse notified, Call light within reach, Left in chair  Plan  Times per week: 1-2x/day, 5-7x/wk  Current Treatment Recommendations: Endurance Training, Patient/Caregiver Education & Training, Equipment Evaluation, Education, & procurement, Self-Care / ADL, Balance Training, Safety Education & Training, Functional Mobility Training, Positioning, Pain Management      Goals  Short term goals  Time Frame for Short term goals: by discharge, patient will  Short term goal 1: perform functional transfers/mobility during ADL routine with SBA using rolling walker with Good safety  Short term goal 2: perform toileting routine with SBA with Good safety  Short term goal 3: verbalize/demonstrate Good understanding of adaptive equipment/compensatory techniques for increased ease and independence with self care/mobility tasks  Short term goal 4: verbalize/demonstrate Good understanding of durable medical equipment for increased bathroom safety for home discharge    OT Individual Minutes  Time In: 7672  Time Out: 1022  Minutes: 60      Electronically signed by ROSELYN Acuña on 9/25/20 at 12:49 PM EDT

## 2020-09-25 NOTE — CONSULTS
TABS Take 1 tablet by mouth 2 times daily 9/23/20  Yes Yue Grayson MD   calcium citrate-vitamin D (CALCIUM CITRATE + D) 315-200 MG-UNIT per tablet Take 2 tablets by mouth 3 times daily Takes 2 early Am   2 morning   1 evening   Yes Historical Provider, MD   Prenatal Multivit-Min-Fe-FA (PRENATAL 1 + IRON PO) Take 1 tablet by mouth daily   Yes Historical Provider, MD   acetaminophen (TYLENOL) 500 MG tablet Take 500 mg by mouth every 6 hours as needed for Pain   Yes Historical Provider, MD        Allergies:     Patient has no known allergies. Social History:     Tobacco:    reports that he quit smoking about 15 years ago. He has never used smokeless tobacco.  Alcohol:      reports previous alcohol use. Drug Use:  reports previous drug use. Family History:     History reviewed. No pertinent family history. Review of Systems:     Positive and Negative as described in HPI. CONSTITUTIONAL:  negative for fevers, chills, sweats, fatigue, weight loss  HEENT:  negative for vision, hearing changes, runny nose, throat pain  RESPIRATORY:  negative for shortness of breath, cough, congestion, wheezing. CARDIOVASCULAR:  negative for chest pain, palpitations.   GASTROINTESTINAL:  negative for nausea, vomiting, diarrhea, constipation, change in bowel habits, abdominal pain   GENITOURINARY:  negative for difficulty of urination, burning with urination, frequency   INTEGUMENT:  negative for rash, skin lesions, easy bruising   HEMATOLOGIC/LYMPHATIC:  negative for swelling/edema   ALLERGIC/IMMUNOLOGIC:  negative for urticaria , itching  ENDOCRINE:  negative increase in drinking, increase in urination, hot or cold intolerance  MUSCULOSKELETAL: BILATERAL KNEE REPLACEMENT PAIN 10 OUT OF 10 NEUROLOGICAL:  negative for headaches, dizziness, lightheadedness, numbness, pain, tingling extremities  BEHAVIOR/PSYCH:  negative for depression, anxiety    Physical Exam:     BP (!) 152/83   Pulse 119   Temp 98.1 °F (36.7 °C) (Oral) Resp 16   Ht 5' 11\" (1.803 m)   Wt (!) 304 lb 7.3 oz (138.1 kg)   SpO2 96%   BMI 42.46 kg/m²   Temp (24hrs), Av °F (37.2 °C), Min:98.1 °F (36.7 °C), Max:100 °F (37.8 °C)    No results for input(s): POCGLU in the last 72 hours. Intake/Output Summary (Last 24 hours) at 2020 1047  Last data filed at 2020 0740  Gross per 24 hour   Intake 1010 ml   Output 1110 ml   Net -100 ml     Morbid obese  General Appearance:  alert, well appearing, and in no acute distress  Mental status: oriented to person, place, and time with normal affect  Head:  normocephalic, atraumatic. Eye: no icterus, redness, pupils equal and reactive, extraocular eye movements intact, conjunctiva clear  Ear: normal external ear, no discharge, hearing intact  Nose:  no drainage noted  Mouth: mucous membranes moist  Neck: supple, no carotid bruits, thyroid not palpable  Lungs: Bilateral equal air entry, clear to ausculation, no wheezing, rales or rhonchi, normal effort  Cardiovascular: normal rate, regular rhythm, no murmur, gallop, rub.   Abdomen: Soft, nontender, nondistended, normal bowel sounds, no hepatomegaly or splenomegaly  Neurologic: There are no new focal motor or sensory deficits, normal muscle tone and bulk, no abnormal sensation, normal speech, cranial nerves II through XII grossly intact  Skin: No gross lesions, rashes, bruising or bleeding on exposed skin area  Extremities: Bilateral posterior knee replacement edema legs noted psych: normal affect    Investigations:      Laboratory Testing:  Recent Results (from the past 24 hour(s))   CBC with DIFF    Collection Time: 20  9:52 PM   Result Value Ref Range    WBC 9.1 3.5 - 11.0 k/uL    RBC 3.48 (L) 4.5 - 5.9 m/uL    Hemoglobin 10.5 (L) 13.5 - 17.5 g/dL    Hematocrit 30.6 (L) 41 - 53 %    MCV 87.9 80 - 100 fL    MCH 30.2 26 - 34 pg    MCHC 34.4 31 - 37 g/dL    RDW 13.9 11.5 - 14.9 %    Platelets 461 343 - 984 k/uL    MPV 8.5 6.0 - 12.0 fL    NRBC Automated NOT REPORTED per 100 WBC    Differential Type NOT REPORTED     Seg Neutrophils 77 (H) 36 - 66 %    Lymphocytes 11 (L) 24 - 44 %    Monocytes 12 (H) 1 - 7 %    Eosinophils % 0 0 - 4 %    Basophils 0 0 - 2 %    Immature Granulocytes NOT REPORTED 0 %    Segs Absolute 7.00 1.3 - 9.1 k/uL    Absolute Lymph # 1.00 1.0 - 4.8 k/uL    Absolute Mono # 1.10 0.1 - 1.3 k/uL    Absolute Eos # 0.00 0.0 - 0.4 k/uL    Basophils Absolute 0.00 0.0 - 0.2 k/uL    Absolute Immature Granulocyte NOT REPORTED 0.00 - 0.30 k/uL    WBC Morphology NOT REPORTED     RBC Morphology NOT REPORTED     Platelet Estimate NOT REPORTED    Basic Metabolic Prof    Collection Time: 09/24/20  9:52 PM   Result Value Ref Range    Glucose 126 (H) 70 - 99 mg/dL    BUN 13 6 - 20 mg/dL    CREATININE 0.82 0.70 - 1.20 mg/dL    Bun/Cre Ratio NOT REPORTED 9 - 20    Calcium 8.3 (L) 8.6 - 10.4 mg/dL    Sodium 135 135 - 144 mmol/L    Potassium 4.3 3.7 - 5.3 mmol/L    Chloride 101 98 - 107 mmol/L    CO2 26 20 - 31 mmol/L    Anion Gap 8 (L) 9 - 17 mmol/L    GFR Non-African American >60 >60 mL/min    GFR African American >60 >60 mL/min    GFR Comment          GFR Staging NOT REPORTED        Imaging/Diagonstics:      Assessment :      Primary Problem  <principal problem not specified>    Active Hospital Problems    Diagnosis Date Noted    Primary osteoarthritis of knees, bilateral [M17.0] 09/22/2020       Plan:     Sinus tachycardia due to pain  Will add fentanyl patch 25 mcg  Continue OxyContin short-acting every 4 hours  DVT prophylaxis on aspirin 325 twice a day  History of gastric bypass 3 years ago   Physical therapy  Okay to discharge when pain control is better and exertional heart rate is less than 110  SEP 25  Pain is adequately controlled 5-6 still tachycardic no pleuritic chest pains clinically no signs of DVT or PE no signs of infection  Will add dose of metoprolol 25 twice a day will give a fluid bolus reassess in couple hours  Consultations:   [address] [place] [address] WORK  IP CONSULT TO INTERNAL MEDICINE      Thomas Griffith MD  9/25/2020  10:47 AM    Copy sent to Dr. Ricarda Akins MD    Please note that this chart was generated using voice recognition Dragon dictation software. Although every effort was made to ensure the accuracy of this automated transcription, some errors in transcription may have occurred.

## 2020-09-25 NOTE — PROGRESS NOTES
Discharge teaching and instructions for procedure of bilateral knees completed with patient using teachback method. AVS reviewed. Printed prescriptions given to patient. Patient voiced understanding regarding prescriptions, follow up appointments, and care of self at home. Discharged in a wheelchair to  home with support per family.

## 2020-09-25 NOTE — PROGRESS NOTES
Physical Therapy  Facility/Department: [facility] MED SURG  Daily Treatment Note  NAME: Kathy Andrew  : 1965  MRN: [id number]    Date of Service: 2020    Discharge Recommendations:  Continue to assess pending progress   PT Equipment Recommendations  Equipment Needed: Yes  Walker: Rolling  ADL Assistive Devices: Grab Bars - shower;Grab Bars - toilet    Assessment   Body structures, Functions, Activity limitations: Decreased functional mobility ; Decreased ADL status; Decreased ROM; Decreased strength;Decreased endurance;Decreased balance; Increased pain;Decreased safe awareness  Assessment: Pt requiring 1 assist for safety, occassional second person for stair training. Pt able to perform transfers and ambulation with HEP program given today. Pt will benefit from continued PT prior to returning home. Treatment Diagnosis: Impaired functional mobility 2* OA bilateral knees  Specific instructions for Next Treatment: Knee ROM flexion> extension, monitor BP prn, progress transfers/gait, stairs with family training, HEP  Prognosis: Good  Decision Making: Low Complexity  History: Pt underwent bilateral TKA on 20 by Dr Vishal Jiménez  Exam: ROM, MMT, bed mobility, sitting balance, activity tolerance  Clinical Presentation: Pt alert, pleasant, cooperative. Barriers to Learning: pain, safety awareness  REQUIRES PT FOLLOW UP: Yes  Activity Tolerance  Activity Tolerance: Patient limited by pain; Patient Tolerated treatment well      Patient Diagnosis(es): The encounter diagnosis was Primary osteoarthritis of knees, bilateral.     has a past medical history of Arthritis and Sleep apnea. has a past surgical history that includes Gastric bypass surgery (); Knee arthroscopy (Left, ); [place] tooth extraction; and REPLACEMENT TOTAL KNEE BILATERAL (Bilateral, 2020).     Restrictions  Restrictions/Precautions  Restrictions/Precautions: General Precautions, Surgical Protocols, Fall Risk, Weight Bearing  Required Braces or Orthoses?: No  Implants present? : Metal implants(B TKA)  Lower Extremity Weight Bearing Restrictions  Right Lower Extremity Weight Bearing: Weight Bearing As Tolerated(Full WBAT)  Left Lower Extremity Weight Bearing: Weight Bearing As Tolerated(Full WBAT)  Position Activity Restriction  Other position/activity restrictions: Activity orders per Dr Yudi Hampton  Chart Reviewed: Yes  Additional Pertinent Hx: arthritis, gastric bypass  Response To Previous Treatment: Patient with no complaints from previous session. Family / Caregiver Present: No  Referring Practitioner: Celina Matthew MD  Subjective  Subjective: First attempt, pt just got up to recliner with RN assistance. Per REGGIE Carnes pt required SBA for transfer. Pt requested writer to return later due to pain. Second attempt pt is agreeable to PT. Pt reports \"6/10\" BLE knee pain. PM: Pt is agreeable to PT. PT states \"I'm shivering\". RN aware. PT just received fluids per RN. PT's temp is 98.3. Pt's HR pre PM tx is 109. General Comment  Comments: REGGIE Carnes ok's pt for PT. Pt's HR is 112-115 at rest pre tx and 110's to 153 during AM tx. Averaging 130's-140's. RN informed. PM: Pt's -142 averaging 120's-130's  during tx. RN informed.    Pain Screening  Patient Currently in Pain: Yes  Pain Assessment  Pain Level: 6  Finney-Baker Pain Rating: No hurt  Pain Type: Acute pain;Surgical pain  Pain Location: Knee  Pain Orientation: Right;Left  Non-Pharmaceutical Pain Intervention(s): Ambulation/Increased Activity;Cold applied;Repositioned  Response to Pain Intervention: Patient Satisfied  Vital Signs  BP Location: Left Arm  Level of Consciousness: Alert  Patient Currently in Pain: Yes  Oxygen Therapy  O2 Device: None (Room air)  Patient Observation  Observations: bilateral knee bandages,        Orientation  Orientation  Overall Orientation Status: Within Normal Limits  Objective   Bed mobility  Comment: Pt states he will be sleeping in his recliner at home. Transfers  Sit to Stand: Contact guard assistance  Stand to sit: Contact guard assistance  Stand Pivot Transfers: Contact guard assistance(w/RW)  Comment: Pt demos good hand placement for all transfers. Pt requires verbal cues for BLE placement. Pt requires increase BUE support for all transfers due to decrease toni knee ROM. Pt slides BLE's forward with full BUE support on recliner to complete stand to sit. Pt demos increase ease with sit to stand transfer from toilet utilizing grab bar. Ambulation  Ambulation?: Yes  WB Status: Full WBAT B LE  More Ambulation?: Yes  Ambulation 1  Surface: level tile  Device: Rolling Walker  Assistance: Contact guard assistance  Quality of Gait: B knees flexed, antalgic slow cassie, forward flexed trunk, small steps  Gait Deviations: Decreased step length;Decreased step height;Slow Cassie  Distance: 15'x2 with one 180* turn. Comments: HR elevated to 153 averaging 130-140's, RN notified  Ambulation 2  Surface - 2: level tile  Device 2: [address] [facility] 2: Contact guard assistance  Quality of Gait 2: same as above, minimal heel strike with initial contact, fwd flexed posture. Vc's for posture/ technique. Gait Deviations: Decreased step length;Decreased step height;Slow Cassie  Distance: 34'x1 and 12'x1  Stairs/Curb  Stairs?: Yes  Stairs  # Steps : 10  Stairs Height: 4\"(and 6\")  Rails: Bilateral  Curbs: 8\"  Device: Rolling walker  Assistance: Contact guard assistance(Grossly CGA x1, occassional Min A x1 with CGA x1)  Comment: Pt educated in correct step to sequencing to ascend/decend steps. Pt ascended steps with circumduction due to decrease knee flexion. Pt able to complete varied height steps with Min difficulty. Pt reports having ramp at home for last resort. Pt also states he has 3 boys who will be able to assist. PT grossly one person assistance however occassionally requires second person for safety.  Pt demos very slow movements to complete 7: Pt to reduce pain to 2-3/10 to improve independence for safe mobility. Patient Goals   Patient goals : To go home    Plan    Plan  Times per week: BID  Specific instructions for Next Treatment: Knee ROM flexion> extension, monitor BP prn, progress transfers/gait, stairs with family training, HEP  Current Treatment Recommendations: Strengthening, ROM, Balance Training, Functional Mobility Training, Transfer Training, Endurance Training, Gait Training, Stair training, Equipment Evaluation, Education, & procurement, Patient/Caregiver Education & Training, Safety Education & Training, Home Exercise Program, Positioning, Pain Management  Safety Devices  Type of devices:  All fall risk precautions in place, Call light within reach, Gait belt, Left in chair, Nurse notified  Restraints  Initially in place: No     Therapy Time   Individual Concurrent Group Co-treatment   Time In [phone])         Time Out [phone])         Minutes 66 Edmund Aguila, PTA

## 2020-09-25 NOTE — PLAN OF CARE
Problem: Falls - Risk of:  Goal: Will remain free from falls  Description: Will remain free from falls  9/25/2020 0149 by Silvano Kennedy RN  Outcome: Ongoing  9/24/2020 1726 by Adria Carroll RN  Outcome: Ongoing  Goal: Absence of physical injury  Description: Absence of physical injury  9/25/2020 0149 by Silvano Kennedy RN  Outcome: Ongoing  9/24/2020 1726 by Adria Carroll RN  Outcome: Ongoing     Problem: Pain:  Goal: Pain level will decrease  Description: Pain level will decrease  9/25/2020 0149 by Silvano Kennedy RN  Outcome: Ongoing  9/24/2020 1726 by Adria Carroll RN  Outcome: Ongoing  Goal: Control of acute pain  Description: Control of acute pain  9/25/2020 0149 by Silvano Kennedy RN  Outcome: Ongoing  9/24/2020 1726 by Adria Carroll RN  Outcome: Ongoing  Goal: Control of chronic pain  Description: Control of chronic pain  9/25/2020 0149 by Silvano Kennedy RN  Outcome: Ongoing  9/24/2020 1726 by Adria Carroll RN  Outcome: Ongoing     Problem: Musculor/Skeletal Functional Status  Goal: Highest potential functional level  9/25/2020 0149 by Silvano Kennedy RN  Outcome: Ongoing  9/24/2020 1726 by Adria Carroll RN  Outcome: Ongoing  Goal: Absence of falls  9/25/2020 0149 by Silvano Kennedy RN  Outcome: Ongoing  9/24/2020 1726 by Adria Carroll RN  Outcome: Ongoing     Problem: Skin Integrity:  Goal: Will show no infection signs and symptoms  Description: Will show no infection signs and symptoms  Outcome: Ongoing  Goal: Absence of new skin breakdown  Description: Absence of new skin breakdown  Outcome: Ongoing

## 2020-09-26 NOTE — DISCHARGE SUMMARY
Discharge Summary     Patient ID:  Nena Love  [id number]  76 y.o.  1965    Admit date: 9/22/2020    Discharge date and time: 9/25/2020  5:39 PM     Admitting Physician: Shanti Hawk MD     Admission Diagnoses: Primary osteoarthritis of knees, bilateral [M17.0]    Discharge Diagnoses: same    Admission Condition: good    Discharged Condition: good    Indication for Admission: Bilateral TKAs    Hospital Course: post op tachycardia    Consults: [facility] for above    Treatments: surgery and PT    Disposition: home    Patient Instructions:   Discharge Medication List as of 9/25/2020  4:43 PM      START taking these medications    Details   metoprolol tartrate (LOPRESSOR) 25 MG tablet Take 1 tablet by mouth 2 times daily, Disp-60 tablet,R-3Normal      oxyCODONE HCl (OXY-IR) 10 MG immediate release tablet Take 1 tablet by mouth every 4 hours as needed for Pain for up to 7 days. , Disp-42 tablet,R-0Print      Aspirin Buf,DaFjh-QmNwq-DtZts, (BUFFERED ASPIRIN) 325 MG TABS Take 1 tablet by mouth 2 times daily, Disp-60 tablet,R-3Normal         CONTINUE these medications which have NOT CHANGED    Details   calcium citrate-vitamin D (CALCIUM CITRATE + D) 315-200 MG-UNIT per tablet Take 2 tablets by mouth 3 times daily Takes 2 early Am   2 morning   1 eveningHistorical Med      Prenatal Multivit-Min-Fe-FA (PRENATAL 1 + IRON PO) Take 1 tablet by mouth dailyHistorical Med      acetaminophen (TYLENOL) 500 MG tablet Take 500 mg by mouth every 6 hours as needed for PainHistorical Med         STOP taking these medications       Niacin (VITAMIN B-3 PO) Comments:   Reason for Stopping:             Activity: activity as tolerated  Diet: regular diet  Wound Care: keep wound clean and dry    Follow-up with Dr. Kassy Aguilera in 2 weeks.     Electronically signed by Shanti Hawk MD on 9/26/2020 at 1:55 PM

## 2020-09-30 ENCOUNTER — TELEPHONE (OUTPATIENT)
Dept: ORTHOPEDIC SURGERY | Age: 55
End: 2020-09-30

## 2020-09-30 NOTE — TELEPHONE ENCOUNTER
Yes the dressing are able to get wet. No he may not shower because the dressing is not completely inclusive to the incision. I do not want water getting down inside the dressing.

## 2020-10-07 ENCOUNTER — OFFICE VISIT (OUTPATIENT)
Dept: ORTHOPEDIC SURGERY | Age: 55
End: 2020-10-07

## 2020-10-07 PROCEDURE — 99024 POSTOP FOLLOW-UP VISIT: CPT | Performed by: ORTHOPAEDIC SURGERY

## 2020-10-07 NOTE — PROGRESS NOTES
Ania Valentino M.D.            [address] [place] [address]., [address] [facility],[address], [address] [facility]           Dept Phone: [phone]           Dept Fax:  [phone]           [place]          Dept Phone: [phone]           Dept Fax:  [phone]      Chief Compliant:  No chief complaint on file. History of Present Illness:  Patient returns today status post bilateral TKA times 2 weeks. Patient has no major complaints     Review of Systems   Constitutional: Negative for fever, chills, sweats, recent injury, recent illness  Neurological: Negative for Headaches, numbness, or weakness. Integumentary: Negative for rash, itching, ecchymosis, or wounds. Musculoskeletal: Positive for No chief complaint on file. Physical Exam:  Constitutional: Patient is oriented to person, place, and time. Patient appears well-developed and well nourished. Musculoskeletal: Normal gait. Motion 0-100 degrees with expected pain with ROM. No Calf tenderness, Negative Wilber's sign. Neurovascular intact. Neurological: Patient is alert and oriented to person, place, and time. Normal strenght. No sensory deficit. Skin: Skin is warm and dry. Incision is healing well without signs of redness or drainage  Nursing note and vitals reviewed. Labs and Imaging:     XR taken today:  Xr Knee Bilateral Standing    Result Date: 10/7/2020  X-rays taken today reviewed by me show standing AP of both knees as well as laterals. Patient status post bilateral total knee arthroplasty. Components appear to be in excellent position on AP and lateral views.   It was noted preoperatively the patient had severe varus deformities of both knees prior to surgery         Orders Placed This Encounter   Procedures    External Referral To Physical Therapy     Referral Priority:   Routine     Referral Type:   Eval and Treat     Referral Reason:   Specialty Services Required     Referred to Provider:   Danyel Stephens     Requested Specialty:   Physical Therapy     Number of Visits Requested:   1       Assessment and Plan:  1. Status post total bilateral knee replacement        2 weeks status post bilateral TKA        This is a 47 y.o. male who presents to the clinic today status post bilateral TKA. Continue anticoagulation. Transition to outpatient Pt. Restrictions given. RTO 5-6 weeks. Call if any problems/issues prior to that     Note: Patient did get lightheaded during office encounter had to sit down for a while. He had been started on some new medication and he was advised to have his primary care physician evaluate him for this as he did get fairly lightheaded and passed out here in the office on his discharge  Provider Attestation:  Bonita Esquivel, personally performed the services described in this documentation. All medical record entries made by the scribe were at my direction and in my presence. I have reviewed the chart and discharge instructions and agree that the records reflect my personal performance and is accurate and complete. Iraida Mcintyre MD. 10/07/20        Please note that this chart was generated using voice recognition Dragon dictation software. Although every effort was made to ensure the accuracy of this automated transcription, some errors in transcription may have occurred.

## 2020-10-13 ENCOUNTER — TELEPHONE (OUTPATIENT)
Dept: ORTHOPEDIC SURGERY | Age: 55
End: 2020-10-13

## 2020-10-13 NOTE — TELEPHONE ENCOUNTER
Patient called and spoke with nurse directly. He is asking for a refill of his PRN pain medication, oxycodone HCl 10 mg, which he states he uses around 9 pm and 1 am, to help him sleep. He states he is \"willing to try something else,\" if oxycodone is no longer appropriate. Patient states that Dr. York Siemens recommended aspirin, as well, and patient wants to know if he should keep taking this. Patient states he was put on metoprolol in the hospital and \"fainted in your office on [address],\" so he is concerned about whether or not to continue taking that medication, as well. Advised patient to call his PCP and / or the hospital and give them the name of the prescribing physician to speak to. Will route this message to Dr. York Siemens and call patient's PCP. Patient states he will call his PCP, as well.

## 2020-11-11 ENCOUNTER — OFFICE VISIT (OUTPATIENT)
Dept: ORTHOPEDIC SURGERY | Age: 55
End: 2020-11-11

## 2020-11-11 VITALS — TEMPERATURE: 97 F

## 2020-11-11 PROCEDURE — 99024 POSTOP FOLLOW-UP VISIT: CPT | Performed by: ORTHOPAEDIC SURGERY

## 2020-11-11 NOTE — PROGRESS NOTES
Kyaw De Leon M.D.            [address] [place] [address]., 1740 [facility],[address], [address] [facility]           Dept Phone: [phone]           Dept Fax:  [phone], [place]          Dept Phone: [phone]           Dept Fax:  [phone]      Chief Compliant:  Chief Complaint   Patient presents with    Post-Op Check     9/22/20 BILATERAL TKA        History of Present Illness: This is a 54 y.o. male who presents to the clinic today for evaluation / follow up of 7 weeks status post bilateral total knees. Patient states that overall he is doing much better than he was the first couple weeks. He is in physical therapy at [facility]. He states he is not quite 100% straight but he is getting close to it. He says the pain is significantly improved. Review of Systems   Constitutional: Negative for fever, chills, sweats. Eyes: Negative for changes in vision, or pain. HENT: Negative for ear ache, epistaxis, or sore throat. Respiratory/Cardio: Negative for Chest pain, palpitations, SOB, or cough. Gastrointestinal: Negative for abdominal pain, N/V/D. Genitourinary: Negative for dysuria, frequency, urgency, or hematuria. Neurological: Negative for headache, numbness, or weakness. Integumentary: Negative for rash, itching, laceration, or abrasion. Musculoskeletal: Positive for Post-Op Check (9/22/20 BILATERAL TKA)       Physical Exam:  Constitutional: Patient is oriented to person, place, and time. Patient appears well-developed and well nourished. HENT: Negative otherwise noted  Head: Normocephalic and Atraumatic  Nose: Normal  Eyes: Conjunctivae and EOM are normal  Neck: Normal range of motion Neck supple. Respiratory/Cardio: Effort normal. No respiratory distress.   Musculoskeletal: Examination of patient's left knee notes that I can push him all the way to full extension but he wants to go back to 5 degrees for leg. He goes back to 5-120 degrees. He has good patellar tracking and good stability throughout. Examination the patient's right knee is essentially identical to the left. He can eat get all the way straight but wants to relax back at 5 degrees. .  Neurological: Patient is alert and oriented to person, place, and time. Normal strenght. No sensory deficit. Skin: Skin is warm and dry  Psychiatric: Behavior is normal. Thought content normal.  Nursing note and vitals reviewed. Labs and Imaging:     XR taken today:  No results found. No orders of the defined types were placed in this encounter. Assessment and Plan:  1. Status post total bilateral knee replacement            This is a 54 y.o. male who presents to the clinic today for evaluation / follow up of several weeks status post bilateral total knees.      Past History:    Current Outpatient Medications:     Cyanocobalamin (VITAMIN B-12) 5000 MCG LOZG, Place 1 tablet under the tongue daily, Disp: , Rfl:     metoprolol tartrate (LOPRESSOR) 25 MG tablet, Take 1 tablet by mouth 2 times daily (Patient not taking: Reported on 10/13/2020), Disp: 60 tablet, Rfl: 3    Aspirin Buf,[id number]-CvDls-IjPds, (BUFFERED ASPIRIN) 325 MG TABS, Take 1 tablet by mouth 2 times daily (Patient not taking: Reported on 10/13/2020), Disp: 60 tablet, Rfl: 3    calcium citrate-vitamin D (CALCIUM CITRATE + D) 315-200 MG-UNIT per tablet, Take 2 tablets by mouth 3 times daily Takes 2 early Am  2 morning  1 evening, Disp: , Rfl:     Prenatal Multivit-Min-Fe-FA (PRENATAL 1 + IRON PO), Take 1 tablet by mouth daily, Disp: , Rfl:     acetaminophen (TYLENOL) 500 MG tablet, Take 500 mg by mouth every 6 hours as needed for Pain, Disp: , Rfl:   Allergies   Allergen Reactions    Lidocaine Hives     Social History     Socioeconomic History    Marital status:      Spouse name: Not on file    Number of children: Not on file    Years of education: Not on file    Highest education level: Not on file   Occupational History    Not on file   Social Needs    Financial resource strain: Not on file    Food insecurity     Worry: Not on file     Inability: Not on file    Transportation needs     Medical: Not on file     Non-medical: Not on file   Tobacco Use    Smoking status: Former Smoker     Last attempt to quit: 2005     Years since quitting: 15.8    Smokeless tobacco: Never Used   Substance and Sexual Activity    Alcohol use: Not Currently    Drug use: Not Currently    Sexual activity: Not on file   Lifestyle    Physical activity     Days per week: Not on file     Minutes per session: Not on file    Stress: Not on file   Relationships    Social connections     Talks on phone: Not on file     Gets together: Not on file     Attends Evangelical service: Not on file     Active member of club or organization: Not on file     Attends meetings of clubs or organizations: Not on file     Relationship status: Not on file    Intimate partner violence     Fear of current or ex partner: Not on file     Emotionally abused: Not on file     Physically abused: Not on file     Forced sexual activity: Not on file   Other Topics Concern    Not on file   Social History Narrative    Not on file     Past Medical History:   Diagnosis Date    Arthritis     Sleep apnea     does not use machine-has improved since gastric bypass     Past Surgical History:   Procedure Laterality Date    GASTRIC BYPASS SURGERY  2017    KNEE ARTHROSCOPY Left 2005    ACL repair    REPLACEMENT TOTAL KNEE BILATERAL Bilateral 9/22/2020    KNEE TOTAL ARTHROPLASTY BILATERAL performed by Yeyo Perez MD at [address]       No family history on file. Plan  Encouraged the patient continue physical therapy to work on the last few degrees.   He did have a significant varus and flexion contracture of at least 15 degrees preoperatively. And encouraged that he is able to be pushed all the way straight. We will see him back here in 4 weeks before review determination return to work status    Provider Attestation:  Kristin Bruce, personally performed the services described in this documentation. All medical record entries made by the scribe were at my direction and in my presence. I have reviewed the chart and discharge instructions and agree that the records reflect my personal performance and is accurate and complete. Niraj Galdamez MD. 11/11/20      Please note that this chart was generated using voice recognition Dragon dictation software. Although every effort was made to ensure the accuracy of this automated transcription, some errors in transcription may have occurred.

## 2020-12-14 ENCOUNTER — OFFICE VISIT (OUTPATIENT)
Dept: ORTHOPEDIC SURGERY | Age: 55
End: 2020-12-14

## 2020-12-14 VITALS — TEMPERATURE: 97.2 F

## 2020-12-14 PROCEDURE — 99024 POSTOP FOLLOW-UP VISIT: CPT | Performed by: ORTHOPAEDIC SURGERY

## 2020-12-14 NOTE — PROGRESS NOTES
Ishaan Gentile M.D.            [address] [facility], [address] [facility]           Dept Phone: [phone]           Dept Fax:  [phone]           [place]          Dept Phone: [phone]           Dept Fax:  [phone]      Chief Compliant:  Chief Complaint   Patient presents with    Follow-up     status post bilat TKA9/22/20        History of Present Illness: This is a 54 y.o. male who presents to the clinic today for evaluation / follow up of bilateral total knees on 9/22/2020. He states that overall he has basically very little to no pain. States that his left side feels a little stiffer than the right. 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 Review of Systems   Constitutional: Negative for fever, chills, sweats. Eyes: Negative for changes in vision, or pain. HENT: Negative for ear ache, epistaxis, or sore throat. Respiratory/Cardio: Negative for Chest pain, palpitations, SOB, or cough. Gastrointestinal: Negative for abdominal pain, N/V/D. Genitourinary: Negative for dysuria, frequency, urgency, or hematuria. Neurological: Negative for headache, numbness, or weakness. Integumentary: Negative for rash, itching, laceration, or abrasion. Musculoskeletal: Positive for Follow-up (status post bilat TKA9/22/20)       Physical Exam:  Constitutional: Patient is oriented to person, place, and time. Patient appears well-developed and well nourished. HENT: Negative otherwise noted  Head: Normocephalic and Atraumatic  Nose: Normal  Eyes: Conjunctivae and EOM are normal  Neck: Normal range of motion Neck supple. Respiratory/Cardio: Effort normal. No respiratory distress. Musculoskeletal: Examination the patient's left knee notes his motion is 0-about 118 degrees.   He has good stability and good patellar Inability: Not on file    Transportation needs     Medical: Not on file     Non-medical: Not on file   Tobacco Use    Smoking status: Former Smoker     Quit date: 2005     Years since quitting: 15.9    Smokeless tobacco: Never Used   Substance and Sexual Activity    Alcohol use: Not Currently    Drug use: Not Currently    Sexual activity: Not on file   Lifestyle    Physical activity     Days per week: Not on file     Minutes per session: Not on file    Stress: Not on file   Relationships    Social connections     Talks on phone: Not on file     Gets together: Not on file     Attends Holiness service: Not on file     Active member of club or organization: Not on file     Attends meetings of clubs or organizations: Not on file     Relationship status: Not on file    Intimate partner violence     Fear of current or ex partner: Not on file     Emotionally abused: Not on file     Physically abused: Not on file     Forced sexual activity: Not on file   Other Topics Concern    Not on file   Social History Narrative    Not on file     Past Medical History:   Diagnosis Date    Arthritis     Sleep apnea     does not use machine-has improved since gastric bypass     Past Surgical History:   Procedure Laterality Date    GASTRIC BYPASS SURGERY  2017    KNEE ARTHROSCOPY Left 2005    ACL repair    REPLACEMENT TOTAL KNEE BILATERAL Bilateral 9/22/2020    KNEE TOTAL ARTHROPLASTY BILATERAL performed by Castro Tobar MD at [facility]       No family history on file. Plan  Patient encouraged to continue physical therapy. Overall he is very pleased. He does wish to return to work on 12/21/2020 with no restrictions. He does not do any heavy lifting or pushing nor climbing ladders. We will see the patient back here in 3 months call there is any problems prior to that time    Provider Attestation:  Rosalina Hernandez, personally performed the services described in this documentation.  All medical record entries made by the scribe were at my direction and in my presence. I have reviewed the chart and discharge instructions and agree that the records reflect my personal performance and is accurate and complete. Carly Curiel MD. 12/14/20      Please note that this chart was generated using voice recognition Dragon dictation software. Although every effort was made to ensure the accuracy of this automated transcription, some errors in transcription may have occurred.

## 2021-03-17 ENCOUNTER — OFFICE VISIT (OUTPATIENT)
Dept: ORTHOPEDIC SURGERY | Age: 56
End: 2021-03-17
Payer: COMMERCIAL

## 2021-03-17 VITALS — TEMPERATURE: 97 F

## 2021-03-17 DIAGNOSIS — Z96.653 STATUS POST TOTAL BILATERAL KNEE REPLACEMENT: Primary | ICD-10-CM

## 2021-03-17 PROCEDURE — 99213 OFFICE O/P EST LOW 20 MIN: CPT | Performed by: ORTHOPAEDIC SURGERY

## 2021-03-17 NOTE — PROGRESS NOTES
Cheryal Goodpasture M.D.            [address] [address] [facility],[address], [address] [facility]           Dept Phone: [phone]           Dept Fax:  [phone], [place]          Dept Phone: [phone]           Dept Fax:  [phone]      Chief Compliant:  Chief Complaint   Patient presents with    Follow-up     bilat TKA 9/22/20        History of Present Illness: This is a 54 y.o. male who presents to the clinic today for evaluation / follow up of status post bilateral total knee Sep 2020. Patient states his knees feel great. There was why gets a little element of stiffness but overall he is very happy and pleased with the results. .       Review of Systems   Constitutional: Negative for fever, chills, sweats. Eyes: Negative for changes in vision, or pain. HENT: Negative for ear ache, epistaxis, or sore throat. Respiratory/Cardio: Negative for Chest pain, palpitations, SOB, or cough. Gastrointestinal: Negative for abdominal pain, N/V/D. Genitourinary: Negative for dysuria, frequency, urgency, or hematuria. Neurological: Negative for headache, numbness, or weakness. Integumentary: Negative for rash, itching, laceration, or abrasion. Musculoskeletal: Positive for Follow-up (bilat TKA 9/22/20)       Physical Exam:  Constitutional: Patient is oriented to person, place, and time. Patient appears well-developed and well nourished. HENT: Negative otherwise noted  Head: Normocephalic and Atraumatic  Nose: Normal  Eyes: Conjunctivae and EOM are normal  Neck: Normal range of motion Neck supple. Respiratory/Cardio: Effort normal. No respiratory distress. Musculoskeletal: Examination of both knees are identical.  He has full extension flexes at least 125°. He has good stability and good patellar tracking he has no pain throughout. Neurological: Patient is alert and oriented to person, place, and time. Normal strenght. No sensory deficit. Skin: Skin is warm and dry  Psychiatric: Behavior is normal. Thought content normal.  Nursing note and vitals reviewed. Labs and Imaging:     XR taken today:  No results found. No orders of the defined types were placed in this encounter. Assessment and Plan:  1. Status post total bilateral knee replacement            This is a 54 y.o. male who presents to the clinic today for evaluation / follow up of impression status post bilateral total knees 6 months doing superbly    Plan  Patient to return back in Sep 2021 call with any problems prior to that time.      Past History:    Current Outpatient Medications:     Cyanocobalamin (VITAMIN B-12) 5000 MCG LOZG, Place 1 tablet under the tongue daily, Disp: , Rfl:     metoprolol tartrate (LOPRESSOR) 25 MG tablet, Take 1 tablet by mouth 2 times daily (Patient not taking: Reported on 10/13/2020), Disp: 60 tablet, Rfl: 3    Aspirin Buf,[id number]-[id number], (BUFFERED ASPIRIN) 325 MG TABS, Take 1 tablet by mouth 2 times daily (Patient not taking: Reported on 10/13/2020), Disp: 60 tablet, Rfl: 3    calcium citrate-vitamin D (CALCIUM CITRATE + D) 315-200 MG-UNIT per tablet, Take 2 tablets by mouth 3 times daily Takes 2 early Am  2 morning  1 evening, Disp: , Rfl:     Prenatal Multivit-Min-Fe-FA (PRENATAL 1 + IRON PO), Take 1 tablet by mouth daily, Disp: , Rfl:     acetaminophen (TYLENOL) 500 MG tablet, Take 500 mg by mouth every 6 hours as needed for Pain, Disp: , Rfl:   Allergies   Allergen Reactions    Lidocaine Hives     Social History     Socioeconomic History    Marital status:      Spouse name: Not on file    Number of children: Not on file    Years of education: Not on file    Highest education level: Not on file   Occupational History    Not on file   Social Needs    Financial resource strain: Not on file    Food insecurity Worry: Not on file     Inability: Not on file    Transportation needs     Medical: Not on file     Non-medical: Not on file   Tobacco Use    Smoking status: Former Smoker     Quit date:      Years since quittin.2    Smokeless tobacco: Never Used   Substance and Sexual Activity    Alcohol use: Not Currently    Drug use: Not Currently    Sexual activity: Not on file   Lifestyle    Physical activity     Days per week: Not on file     Minutes per session: Not on file    Stress: Not on file   Relationships    Social connections     Talks on phone: Not on file     Gets together: Not on file     Attends Restoration service: Not on file     Active member of club or organization: Not on file     Attends meetings of clubs or organizations: Not on file     Relationship status: Not on file    Intimate partner violence     Fear of current or ex partner: Not on file     Emotionally abused: Not on file     Physically abused: Not on file     Forced sexual activity: Not on file   Other Topics Concern    Not on file   Social History Narrative    Not on file     Past Medical History:   Diagnosis Date    Arthritis     Sleep apnea     does not use machine-has improved since gastric bypass     Past Surgical History:   Procedure Laterality Date    GASTRIC BYPASS SURGERY  2017    KNEE ARTHROSCOPY Left 2005    ACL repair    REPLACEMENT TOTAL KNEE BILATERAL Bilateral 2020    KNEE TOTAL ARTHROPLASTY BILATERAL performed by Baeu Connell MD at [address] [place] [address]       No family history on file. Provider Attestation:  Antony Estrada, personally performed the services described in this documentation. All medical record entries made by the scribe were at my direction and in my presence. I have reviewed the chart and discharge instructions and agree that the records reflect my personal performance and is accurate and complete.  Beau Connell MD. 21      Please note that this chart was generated using voice recognition Dragon dictation software. Although every effort was made to ensure the accuracy of this automated transcription, some errors in transcription may have occurred.

## 2021-09-27 ENCOUNTER — OFFICE VISIT (OUTPATIENT)
Dept: ORTHOPEDIC SURGERY | Age: 56
End: 2021-09-27
Payer: COMMERCIAL

## 2021-09-27 DIAGNOSIS — Z96.653 H/O TOTAL KNEE REPLACEMENT, BILATERAL: Primary | ICD-10-CM

## 2021-09-27 PROCEDURE — 99213 OFFICE O/P EST LOW 20 MIN: CPT | Performed by: ORTHOPAEDIC SURGERY
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
## (undated) DEVICE — DUAL CUT SAGITTAL BLADE

## (undated) DEVICE — SPONGE LAP W18XL18IN WHT COT 4 PLY FLD STRUNG RADPQ DISP ST

## (undated) DEVICE — SOLUTION IV IRRIG WATER 1000ML POUR BRL 2F7114

## (undated) DEVICE — SOLUTION IV IRRIG POUR BRL 0.9% SODIUM CHL 2F7124

## (undated) DEVICE — 3M™ IOBAN™ 2 ANTIMICROBIAL INCISE DRAPE 6650EZ: Brand: IOBAN™ 2

## (undated) DEVICE — GLOVE ORTHO 8   MSG9480

## (undated) DEVICE — Z DUP USE 2428662 DRESSING POSTOP AG PRISMASEAL 3.5X14IN

## (undated) DEVICE — INTENDED FOR TISSUE SEPARATION, AND OTHER PROCEDURES THAT REQUIRE A SHARP SURGICAL BLADE TO PUNCTURE OR CUT.: Brand: BARD-PARKER ® CARBON RIB-BACK BLADES

## (undated) DEVICE — MERCY HEALTH ST CHARLES: Brand: MEDLINE INDUSTRIES, INC.

## (undated) DEVICE — YANKAUER,SMOOTH HANDLE,HIGH CAPACITY: Brand: MEDLINE INDUSTRIES, INC.

## (undated) DEVICE — Z INACTIVE USE 2660664 SOLUTION IRRIG 3000ML 0.9% SOD CHL USP UROMATIC PLAS CONT

## (undated) DEVICE — SUTURE STRATAFIX SYMMETRIC PDS + SZ 1 L18IN ABSRB VLT L48MM SXPP1A400

## (undated) DEVICE — BLANKET WRM W29.9XL79.1IN UP BODY FORC AIR MISTRAL-AIR

## (undated) DEVICE — SUTURE FIBERWIRE SZ 2 L38IN NONABSORBABLE BLU L36.6MM 1/2 AR7202

## (undated) DEVICE — APPLICATOR MEDICATED 26 CC SOLUTION HI LT ORNG CHLORAPREP

## (undated) DEVICE — PAD,ABDOMINAL,8"X7.5",ST,LF,20/BX: Brand: MEDLINE INDUSTRIES, INC.

## (undated) DEVICE — 2108 SERIES SAGITTAL BLADE FLARED, GROUND  (29.0 X 1.32 X 84.0MM)

## (undated) DEVICE — KIT SEP W/ BLD DRAW TB SYR NDL TRNQT PD

## (undated) DEVICE — BNDG,ELSTC,MATRIX,STRL,6"X5YD,LF,HOOK&LP: Brand: MEDLINE

## (undated) DEVICE — ELECTRODE ES L3IN S STL BLDE INSUL DISP VALLEYLAB EDGE

## (undated) DEVICE — 4-PORT MANIFOLD: Brand: NEPTUNE 2

## (undated) DEVICE — DRESSING PETRO W3XL8IN OIL EMUL N ADH GZ KNIT IMPREG CELOS

## (undated) DEVICE — HIGH FLOW TIP

## (undated) DEVICE — SUTURE VCRL SZ 0 L36IN ABSRB UD CT-1 L36MM 1/2 CIR TAPR PNT VCP946H

## (undated) DEVICE — DRAPE, EXTREMITY, BILATERAL, STERILE: Brand: MEDLINE

## (undated) DEVICE — CLOSURE SKIN FLX NONINVASIVE PRELOC TECHNOLOGY FOR 24IN

## (undated) DEVICE — STOCKINETTE,IMPERVIOUS,12X48,STERILE: Brand: MEDLINE

## (undated) DEVICE — SUTURE STRATAFIX SPRL MCRYL + SZ 2 0 L27IN ABSRB UD W NDL SXMP1B419

## (undated) DEVICE — 3M™ STERI-DRAPE™ U-DRAPE 1015: Brand: STERI-DRAPE™

## (undated) DEVICE — INTENDED TO SUPPORT AND MAINTAIN THE POSITION OF AN ANESTHETIZED PATIENT DURING SURGERY: Brand: HERMANTOR XL PINK KNEE POSITIONING PAD

## (undated) DEVICE — 450 ML BOTTLE OF 0.05% CHLORHEXIDINE GLUCONATE IN 99.95% STERILE WATER FOR IRRIGATION, USP AND APPLICATOR.: Brand: IRRISEPT ANTIMICROBIAL WOUND LAVAGE

## (undated) DEVICE — BANDAGE COMPR M W6INXL10YD WHT BGE VELC E MTRX HK AND LOOP

## (undated) DEVICE — MARKER,SKIN,WI/RULER AND LABELS: Brand: MEDLINE

## (undated) DEVICE — PADDING CAST W6INXL4YD COT LO LINTING WYTEX

## (undated) DEVICE — GLOVE ORANGE PI 8 1/2   MSG9085

## (undated) DEVICE — DRAPE,U/ SHT,SPLIT,PLAS,STERIL: Brand: MEDLINE

## (undated) DEVICE — Device

## (undated) DEVICE — NEEDLE SPNL 18GA L3.5IN W/ QNCKE SHARPER BVL DURA CLICK

## (undated) DEVICE — KIT AUTOTRNS APPL AERO 2 SET SYR 2 TIP FOR PLT SEP SYS GPS

## (undated) DEVICE — MASTISOL ADHESIVE LIQ 2/3ML

## (undated) DEVICE — DRAPE,REIN 53X77,STERILE: Brand: MEDLINE

## (undated) DEVICE — BANDAGE,ELASTIC,ESMARK,STERILE,6"X9',LF: Brand: MEDLINE

## (undated) DEVICE — COOLER THER 20-31IN L CRYO COMB W/ PD KNEE TB GRAV FLO

## (undated) DEVICE — CEMENT MIXING SYSTEM WITH FEMORAL BREAKWAY NOZZLE: Brand: REVOLUTION